# Patient Record
Sex: MALE | Race: WHITE | NOT HISPANIC OR LATINO | Employment: FULL TIME | URBAN - METROPOLITAN AREA
[De-identification: names, ages, dates, MRNs, and addresses within clinical notes are randomized per-mention and may not be internally consistent; named-entity substitution may affect disease eponyms.]

---

## 2023-10-22 PROBLEM — F41.9 ANXIETY: Status: RESOLVED | Noted: 2023-10-22 | Resolved: 2023-10-22

## 2023-10-23 ENCOUNTER — APPOINTMENT (OUTPATIENT)
Dept: PRIMARY CARE | Facility: CLINIC | Age: 43
End: 2023-10-23
Payer: COMMERCIAL

## 2023-10-23 NOTE — PROGRESS NOTES
Subjective   Patient ID: Huber Owens is a 43 y.o. male who presents for No chief complaint on file..  HPI    Review of Systems    Objective   Physical Exam    Assessment/Plan   Problem List Items Addressed This Visit    None

## 2023-10-31 ENCOUNTER — OFFICE VISIT (OUTPATIENT)
Dept: PRIMARY CARE | Facility: CLINIC | Age: 43
End: 2023-10-31
Payer: COMMERCIAL

## 2023-10-31 VITALS
SYSTOLIC BLOOD PRESSURE: 135 MMHG | OXYGEN SATURATION: 97 % | TEMPERATURE: 98.4 F | HEIGHT: 67 IN | BODY MASS INDEX: 31.96 KG/M2 | HEART RATE: 79 BPM | DIASTOLIC BLOOD PRESSURE: 95 MMHG | WEIGHT: 203.6 LBS

## 2023-10-31 DIAGNOSIS — R73.9 ELEVATED BLOOD SUGAR: ICD-10-CM

## 2023-10-31 DIAGNOSIS — R09.81 SINUS CONGESTION: ICD-10-CM

## 2023-10-31 DIAGNOSIS — L29.9 ITCHING: Primary | ICD-10-CM

## 2023-10-31 DIAGNOSIS — F17.200 SMOKER: ICD-10-CM

## 2023-10-31 PROCEDURE — 80053 COMPREHEN METABOLIC PANEL: CPT

## 2023-10-31 PROCEDURE — 84443 ASSAY THYROID STIM HORMONE: CPT

## 2023-10-31 PROCEDURE — 83036 HEMOGLOBIN GLYCOSYLATED A1C: CPT

## 2023-10-31 PROCEDURE — 36415 COLL VENOUS BLD VENIPUNCTURE: CPT

## 2023-10-31 PROCEDURE — 99213 OFFICE O/P EST LOW 20 MIN: CPT | Performed by: NURSE PRACTITIONER

## 2023-10-31 PROCEDURE — 85025 COMPLETE CBC W/AUTO DIFF WBC: CPT

## 2023-10-31 RX ORDER — PREDNISONE 10 MG/1
TABLET ORAL
Qty: 21 TABLET | Refills: 0 | Status: SHIPPED | OUTPATIENT
Start: 2023-10-31 | End: 2023-11-01 | Stop reason: ALTCHOICE

## 2023-10-31 ASSESSMENT — ENCOUNTER SYMPTOMS
DIARRHEA: 0
VOMITING: 0
POLYDIPSIA: 0
NUMBNESS: 0
EYE PAIN: 0
EYE REDNESS: 0
COLOR CHANGE: 0
TROUBLE SWALLOWING: 0
VOICE CHANGE: 0
POLYPHAGIA: 0
FEVER: 0
SINUS PRESSURE: 0
ACTIVITY CHANGE: 0
UNEXPECTED WEIGHT CHANGE: 0
EYE DISCHARGE: 0
CHEST TIGHTNESS: 0
SHORTNESS OF BREATH: 0
CHOKING: 0
WOUND: 0
BRUISES/BLEEDS EASILY: 0
EYE ITCHING: 0
WEAKNESS: 0
FATIGUE: 0
PHOTOPHOBIA: 0
FACIAL SWELLING: 0
RHINORRHEA: 0
STRIDOR: 0
PALPITATIONS: 0
DIAPHORESIS: 0
HEADACHES: 0
WHEEZING: 0
ADENOPATHY: 0
ABDOMINAL PAIN: 0
SINUS PAIN: 0
NAUSEA: 0
ROS SKIN COMMENTS: ITCHING
APPETITE CHANGE: 0
APNEA: 0
DIZZINESS: 0
LIGHT-HEADEDNESS: 0
CHILLS: 0
COUGH: 0
SORE THROAT: 0

## 2023-10-31 NOTE — PROGRESS NOTES
"Subjective   Patient ID: Huber Owens is a 43 y.o. male who presents for itching.    HPI   Patient in office with complaint of itching to upper back (no rash) and nasal congestion x 2 weeks. Smoker. No other symptoms or concerns.     Review of Systems   Constitutional:  Negative for activity change, appetite change, chills, diaphoresis, fatigue, fever and unexpected weight change.   HENT:  Positive for congestion. Negative for ear discharge, ear pain, facial swelling, mouth sores, nosebleeds, postnasal drip, rhinorrhea, sinus pressure, sinus pain, sneezing, sore throat, tinnitus, trouble swallowing and voice change.    Eyes:  Negative for photophobia, pain, discharge, redness, itching and visual disturbance.   Respiratory:  Negative for apnea, cough, choking, chest tightness, shortness of breath, wheezing and stridor.    Cardiovascular:  Negative for chest pain, palpitations and leg swelling.   Gastrointestinal:  Negative for abdominal pain, diarrhea, nausea and vomiting.   Endocrine: Negative for cold intolerance, heat intolerance, polydipsia, polyphagia and polyuria.   Musculoskeletal:  Negative for gait problem.   Skin:  Negative for color change, pallor, rash and wound.        itching   Neurological:  Negative for dizziness, syncope, weakness, light-headedness, numbness and headaches.   Hematological:  Negative for adenopathy. Does not bruise/bleed easily.       Objective   BP (!) 135/95   Pulse 79   Temp 36.9 °C (98.4 °F) (Temporal)   Ht 1.702 m (5' 7\")   Wt 92.4 kg (203 lb 9.6 oz)   SpO2 97%   BMI 31.89 kg/m²     Physical Exam  Constitutional:       Appearance: Normal appearance.   HENT:      Head: Normocephalic.      Right Ear: Tympanic membrane, ear canal and external ear normal.      Left Ear: Tympanic membrane, ear canal and external ear normal.      Nose: Congestion present.      Mouth/Throat:      Mouth: Mucous membranes are moist.      Pharynx: Oropharynx is clear. No oropharyngeal exudate or " posterior oropharyngeal erythema.   Eyes:      General: No scleral icterus.        Right eye: No discharge.         Left eye: No discharge.      Conjunctiva/sclera: Conjunctivae normal.      Pupils: Pupils are equal, round, and reactive to light.   Cardiovascular:      Rate and Rhythm: Normal rate and regular rhythm.      Pulses: Normal pulses.      Heart sounds: Normal heart sounds.   Pulmonary:      Effort: Pulmonary effort is normal.      Breath sounds: Normal breath sounds.   Musculoskeletal:      Cervical back: Normal range of motion and neck supple. No rigidity or tenderness.   Lymphadenopathy:      Cervical: No cervical adenopathy.   Skin:     Coloration: Skin is not jaundiced or pale.      Findings: No bruising, erythema, lesion or rash.      Comments: No rash or skin abnormality observed associated with lower thoracic area of itching    Neurological:      Mental Status: He is alert.       Assessment/Plan     Exam finding reviewed with patient. Medications discussed. May use OTC Zyrtec for itching, PRN. Advocated smoking cessation. We will call with lab results when received and update the plan of care. Follow up in 1 week or earlier if no improvement.

## 2023-11-01 DIAGNOSIS — E11.9 TYPE 2 DIABETES MELLITUS WITHOUT COMPLICATION, WITHOUT LONG-TERM CURRENT USE OF INSULIN (MULTI): Primary | ICD-10-CM

## 2023-11-01 DIAGNOSIS — R73.9 ELEVATED BLOOD SUGAR: Primary | ICD-10-CM

## 2023-11-01 LAB
ALBUMIN SERPL BCP-MCNC: 4.7 G/DL (ref 3.4–5)
ALP SERPL-CCNC: 63 U/L (ref 33–120)
ALT SERPL W P-5'-P-CCNC: 45 U/L (ref 10–52)
ANION GAP SERPL CALC-SCNC: 15 MMOL/L (ref 10–20)
AST SERPL W P-5'-P-CCNC: 25 U/L (ref 9–39)
BASOPHILS # BLD AUTO: 0.04 X10*3/UL (ref 0–0.1)
BASOPHILS NFR BLD AUTO: 0.5 %
BILIRUB SERPL-MCNC: 1.2 MG/DL (ref 0–1.2)
BUN SERPL-MCNC: 11 MG/DL (ref 6–23)
CALCIUM SERPL-MCNC: 9.8 MG/DL (ref 8.6–10.6)
CHLORIDE SERPL-SCNC: 98 MMOL/L (ref 98–107)
CO2 SERPL-SCNC: 24 MMOL/L (ref 21–32)
CREAT SERPL-MCNC: 0.93 MG/DL (ref 0.5–1.3)
EOSINOPHIL # BLD AUTO: 0.09 X10*3/UL (ref 0–0.7)
EOSINOPHIL NFR BLD AUTO: 1.1 %
ERYTHROCYTE [DISTWIDTH] IN BLOOD BY AUTOMATED COUNT: 12.4 % (ref 11.5–14.5)
EST. AVERAGE GLUCOSE BLD GHB EST-MCNC: 217 MG/DL
GFR SERPL CREATININE-BSD FRML MDRD: >90 ML/MIN/1.73M*2
GLUCOSE SERPL-MCNC: 195 MG/DL (ref 74–99)
HBA1C MFR BLD: 9.2 %
HCT VFR BLD AUTO: 51.3 % (ref 41–52)
HGB BLD-MCNC: 17.2 G/DL (ref 13.5–17.5)
IMM GRANULOCYTES # BLD AUTO: 0.03 X10*3/UL (ref 0–0.7)
IMM GRANULOCYTES NFR BLD AUTO: 0.4 % (ref 0–0.9)
LYMPHOCYTES # BLD AUTO: 2.36 X10*3/UL (ref 1.2–4.8)
LYMPHOCYTES NFR BLD AUTO: 27.9 %
MCH RBC QN AUTO: 28.2 PG (ref 26–34)
MCHC RBC AUTO-ENTMCNC: 33.5 G/DL (ref 32–36)
MCV RBC AUTO: 84 FL (ref 80–100)
MONOCYTES # BLD AUTO: 0.62 X10*3/UL (ref 0.1–1)
MONOCYTES NFR BLD AUTO: 7.3 %
NEUTROPHILS # BLD AUTO: 5.32 X10*3/UL (ref 1.2–7.7)
NEUTROPHILS NFR BLD AUTO: 62.8 %
NRBC BLD-RTO: 0 /100 WBCS (ref 0–0)
PLATELET # BLD AUTO: 240 X10*3/UL (ref 150–450)
PMV BLD AUTO: 10.9 FL (ref 7.5–11.5)
POTASSIUM SERPL-SCNC: 4.3 MMOL/L (ref 3.5–5.3)
PROT SERPL-MCNC: 7.6 G/DL (ref 6.4–8.2)
RBC # BLD AUTO: 6.1 X10*6/UL (ref 4.5–5.9)
SODIUM SERPL-SCNC: 133 MMOL/L (ref 136–145)
TSH SERPL-ACNC: 1.1 MIU/L (ref 0.44–3.98)
WBC # BLD AUTO: 8.5 X10*3/UL (ref 4.4–11.3)

## 2023-11-01 RX ORDER — INSULIN PUMP SYRINGE, 3 ML
EACH MISCELLANEOUS
Qty: 1 EACH | Refills: 0 | Status: SHIPPED | OUTPATIENT
Start: 2023-11-01 | End: 2023-11-01

## 2023-11-01 RX ORDER — METFORMIN HYDROCHLORIDE 500 MG/1
500 TABLET ORAL
Qty: 60 TABLET | Refills: 3 | Status: SHIPPED | OUTPATIENT
Start: 2023-11-01 | End: 2023-12-07 | Stop reason: DRUGHIGH

## 2023-11-01 RX ORDER — BLOOD SUGAR DIAGNOSTIC
STRIP MISCELLANEOUS
Qty: 100 STRIP | Refills: 1 | Status: SHIPPED | OUTPATIENT
Start: 2023-11-01 | End: 2023-12-07 | Stop reason: SDUPTHER

## 2023-11-01 RX ORDER — LANCETS
EACH MISCELLANEOUS
Qty: 100 EACH | Refills: 1 | Status: SHIPPED | OUTPATIENT
Start: 2023-11-01

## 2023-11-01 NOTE — PROGRESS NOTES
Subjective   Patient ID: Huber Owens is a 43 y.o. male who presents for No chief complaint on file..    HPI     Review of Systems    Objective   There were no vitals taken for this visit.    Physical Exam    Assessment/Plan

## 2023-11-02 NOTE — RESULT ENCOUNTER NOTE
Lab results reviewed with patient. Patient educated on diabetes diagnosis and the importance of continuous close blood glucose control. Advised the patient to stop oral Prednisone treatment. Explained to the patient that his itching (paraesthesia) is likely related to his diabetes. He will start checking and recording his AM fasting glucose levels daily and as needed. We will review hsi recorded results at the next office visit. The patient will call the office with levels <70 and >300. Emergent signs of hypo- and hyperglycemia discussed; patient verbalized understanding. The patient understands how to address low blood sugar by eating a sweet snack or drinking orange juice. Medications discussed. Discussed needs for statin, anti-hyperglycemic, Lisinopril, and Aspirin treatment. Risks and benefits explained in detail; the patient verbalized understanding. He agrees to starting on Metformin. Patient declines any additional medication at this time. Follow up in 4 weeks for reevaluation and blood sugar review or earlier as needed.     Benefits of healthy lifestyle discussed: eat smaller, regular meals, low carbohydrates/fat/sugar diet; use lean white instead of red meet; use several servings of low-sugar fruit and vegetables daily; use whole grain flour instead of white flour products; cook at home frequently instead of eating out; exercise 30-90 minutes 5 x/week.

## 2023-11-07 ENCOUNTER — TELEPHONE (OUTPATIENT)
Dept: PRIMARY CARE | Facility: CLINIC | Age: 43
End: 2023-11-07
Payer: COMMERCIAL

## 2023-11-07 NOTE — TELEPHONE ENCOUNTER
Huber Owens phoned, [phone].  He made an appointment for one month out.  I was not sure if this was one month or a physical. So, I scheduled him for a physical on 12/7 at 2:00 p.m. If this is not a physical - let me know and I will change this. He also made an appointment for 3 months out for his A1C and cholesterol on 2/8 at 2:00 p.m.  Does he need an appointment a week prior for blood work for this.  If so, let me know and I can call him back to make this appointment too.

## 2023-12-07 ENCOUNTER — OFFICE VISIT (OUTPATIENT)
Dept: PRIMARY CARE | Facility: CLINIC | Age: 43
End: 2023-12-07
Payer: COMMERCIAL

## 2023-12-07 VITALS
HEART RATE: 76 BPM | BODY MASS INDEX: 31.58 KG/M2 | OXYGEN SATURATION: 97 % | WEIGHT: 201.6 LBS | SYSTOLIC BLOOD PRESSURE: 119 MMHG | DIASTOLIC BLOOD PRESSURE: 80 MMHG | TEMPERATURE: 97.2 F

## 2023-12-07 DIAGNOSIS — E11.9 TYPE 2 DIABETES MELLITUS WITHOUT COMPLICATION, WITHOUT LONG-TERM CURRENT USE OF INSULIN (MULTI): ICD-10-CM

## 2023-12-07 PROCEDURE — 99213 OFFICE O/P EST LOW 20 MIN: CPT | Performed by: NURSE PRACTITIONER

## 2023-12-07 PROCEDURE — 3046F HEMOGLOBIN A1C LEVEL >9.0%: CPT | Performed by: NURSE PRACTITIONER

## 2023-12-07 PROCEDURE — 3074F SYST BP LT 130 MM HG: CPT | Performed by: NURSE PRACTITIONER

## 2023-12-07 PROCEDURE — 3079F DIAST BP 80-89 MM HG: CPT | Performed by: NURSE PRACTITIONER

## 2023-12-07 RX ORDER — BLOOD SUGAR DIAGNOSTIC
STRIP MISCELLANEOUS
Qty: 100 STRIP | Refills: 2 | Status: SHIPPED | OUTPATIENT
Start: 2023-12-07

## 2023-12-07 RX ORDER — BLOOD-GLUCOSE METER
EACH MISCELLANEOUS
COMMUNITY
Start: 2023-11-01

## 2023-12-07 RX ORDER — METFORMIN HYDROCHLORIDE 500 MG/1
TABLET ORAL
Qty: 30 TABLET | Refills: 1 | Status: SHIPPED | OUTPATIENT
Start: 2023-12-07 | End: 2024-01-03 | Stop reason: DRUGHIGH

## 2023-12-07 RX ORDER — METFORMIN HYDROCHLORIDE 850 MG/1
TABLET ORAL
Qty: 30 TABLET | Refills: 3 | Status: SHIPPED | OUTPATIENT
Start: 2023-12-07 | End: 2024-02-06 | Stop reason: WASHOUT

## 2023-12-07 ASSESSMENT — ENCOUNTER SYMPTOMS
APNEA: 0
EYE DISCHARGE: 0
STRIDOR: 0
DIAPHORESIS: 0
TROUBLE SWALLOWING: 0
PHOTOPHOBIA: 0
NAUSEA: 0
POLYPHAGIA: 0
UNEXPECTED WEIGHT CHANGE: 0
CHOKING: 0
ACTIVITY CHANGE: 0
HEADACHES: 0
ADENOPATHY: 0
BRUISES/BLEEDS EASILY: 0
VOICE CHANGE: 0
FATIGUE: 0
WOUND: 0
CHILLS: 0
DIARRHEA: 0
SINUS PRESSURE: 0
EYE ITCHING: 0
PALPITATIONS: 0
EYE REDNESS: 0
SHORTNESS OF BREATH: 0
APPETITE CHANGE: 0
FEVER: 0
LIGHT-HEADEDNESS: 0
FACIAL SWELLING: 0
CHEST TIGHTNESS: 0
SINUS PAIN: 0
DIZZINESS: 0
COUGH: 0
NUMBNESS: 0
COLOR CHANGE: 0
WEAKNESS: 0
SORE THROAT: 0
EYE PAIN: 0
POLYDIPSIA: 0
ABDOMINAL PAIN: 0
VOMITING: 0
RHINORRHEA: 0
WHEEZING: 0

## 2023-12-07 NOTE — PROGRESS NOTES
Subjective   Patient ID: Huber Owens is a 43 y.o. male who presents for itching.    HPI   Patient in office for a follow-up on DM 2. He has made dietary adjustments and has been checking his blood sugars levels regularly after getting up at 4:30 AM (he works early shift) and in the afternoon (around 4 PM, after work). His morning levels have been in the 160s-170s on average. His afternoon levels have been lower: 110s-130s.  His itching has subsided, and he is feeling much better overall.     The patient monitors his feet for lesions. He requests a referral to ophthalmology for a diabetic eye exam (hand-written prescription provided). The patient declines repeat lab work today. He agrees to a medication dose adjustment.     Review of Systems   Constitutional:  Negative for activity change, appetite change, chills, diaphoresis, fatigue, fever and unexpected weight change.   HENT:  Negative for congestion, ear discharge, ear pain, facial swelling, mouth sores, nosebleeds, postnasal drip, rhinorrhea, sinus pressure, sinus pain, sneezing, sore throat, tinnitus, trouble swallowing and voice change.    Eyes:  Negative for photophobia, pain, discharge, redness, itching and visual disturbance.   Respiratory:  Negative for apnea, cough, choking, chest tightness, shortness of breath, wheezing and stridor.    Cardiovascular:  Negative for chest pain, palpitations and leg swelling.   Gastrointestinal:  Negative for abdominal pain, diarrhea, nausea and vomiting.   Endocrine: Negative for cold intolerance, heat intolerance, polydipsia, polyphagia and polyuria.   Musculoskeletal:  Negative for gait problem.   Skin:  Negative for color change, pallor, rash and wound.   Neurological:  Negative for dizziness, syncope, weakness, light-headedness, numbness and headaches.   Hematological:  Negative for adenopathy. Does not bruise/bleed easily.     Objective   /80   Pulse 76   Temp 36.2 °C (97.2 °F) (Temporal)   Wt 91.4 kg  (201 lb 9.6 oz)   SpO2 97%   BMI 31.58 kg/m²       Assessment/Plan     Exam findings reviewed with patient. Importance of continuous close blood glucose control reviewed. Continue checking and recording AM fasting glucose levels daily and as needed. We will review his recorded results at the next office visit. The patient will call the office with levels <70 and >300. Emergent signs of hypo- and hyperglycemia discussed; patient verbalized understanding. The patient understands how to address low blood sugar by eating a sweet snack or drinking orange juice. Medications reviewed. The patient would like to hold off on statin, Lisinopril, and Aspirin treatment. Risks and benefits explained in detail; the patient verbalized understanding. Follow up in 8 weeks for reevaluation, physical and A1C recheck or earlier as needed.      Benefits of healthy lifestyle discussed: eat smaller, regular meals, low carbohydrates/fat/sugar diet; use lean white instead of red meet; use several servings of low-sugar fruit and vegetables daily; use whole grain flour instead of white flour products; cook at home frequently instead of eating out; exercise 30-90 minutes 5 x/week.

## 2023-12-13 DIAGNOSIS — E11.9 TYPE 2 DIABETES MELLITUS WITHOUT COMPLICATION, WITHOUT LONG-TERM CURRENT USE OF INSULIN (MULTI): Primary | ICD-10-CM

## 2023-12-18 ENCOUNTER — APPOINTMENT (OUTPATIENT)
Dept: PHARMACY | Facility: HOSPITAL | Age: 43
End: 2023-12-18
Payer: COMMERCIAL

## 2023-12-19 ENCOUNTER — TELEMEDICINE (OUTPATIENT)
Dept: PHARMACY | Facility: HOSPITAL | Age: 43
End: 2023-12-19
Payer: COMMERCIAL

## 2023-12-19 DIAGNOSIS — E11.9 TYPE 2 DIABETES MELLITUS WITHOUT COMPLICATION, WITHOUT LONG-TERM CURRENT USE OF INSULIN (MULTI): Primary | ICD-10-CM

## 2023-12-19 ASSESSMENT — ENCOUNTER SYMPTOMS
BLURRED VISION: 1
VISUAL CHANGE: 1

## 2023-12-19 NOTE — PROGRESS NOTES
"Subjective     Patient ID: Huber Owens is a 43 y.o. male who presents for diabetes management.    Diabetes  He presents for his initial diabetic visit. He has type 2 diabetes mellitus. There are no hypoglycemic associated symptoms. Associated symptoms include blurred vision and visual change. There are no hypoglycemic complications. Diabetic complications include autonomic neuropathy and retinopathy. Risk factors for coronary artery disease include diabetes mellitus, obesity and male sex. Current diabetic treatment includes oral agent (monotherapy). His home blood glucose trend is decreasing steadily. His overall blood glucose range is 140-180 mg/dl. An ACE inhibitor/angiotensin II receptor blocker is not being taken. He does not see a podiatrist.Eye exam is current.     No Known Allergies    Objective     Current DM Pharmacotherapy:   -metformin 500mg 1 po every day in the morning  -metformin 850mg 1 po hs    SECONDARY PREVENTION  - Statin? No  - ACE-I/ARB? No  - Aspirin? No    Current monitoring regimen:   Patient is using: glucometer    SMBG Readings Random:   172, 168, 116, 141, 234, 100, 151, 155, 121, 173, 166, 184, 221, 171, 175  Average 163     Any episodes of hypoglycemia? No  Hypoglycemia awareness? No    Pertinent PMH Review:  - PMH of Pancreatitis: No  - PMH/FH of Medullary Thyroid Cancer: Yes  - PMH of Retinopathy: Yes (following with opthalmology)  - PMH of Urinary Tract Infections: No    Lab Review  Lab Results   Component Value Date    BILITOT 1.2 10/31/2023    CALCIUM 9.8 10/31/2023    CO2 24 10/31/2023    CL 98 10/31/2023    CREATININE 0.93 10/31/2023    GLUCOSE 195 (H) 10/31/2023    ALKPHOS 63 10/31/2023    K 4.3 10/31/2023    PROT 7.6 10/31/2023     (L) 10/31/2023    AST 25 10/31/2023    ALT 45 10/31/2023    BUN 11 10/31/2023    ANIONGAP 15 10/31/2023    ALBUMIN 4.7 10/31/2023     No results found for: \"TRIG\", \"CHOL\", \"LDL\", \"LDLCALC\", \"HDL\"  Lab Results   Component Value Date    HGBA1C " 9.2 (H) 10/31/2023     The ASCVD Risk score (Taras ZUNIGA, et al., 2019) failed to calculate for the following reasons:    Cannot find a previous HDL lab    Cannot find a previous total cholesterol lab    Assessment/Plan     Problem List Items Addressed This Visit       Type 2 diabetes mellitus without complication, without long-term current use of insulin (CMS/[facility]) - Primary     Patient recently diagnosed with diabetes. He was increased from metformin 500mg qam to metformin 500mg qam and 850mg hs. Fasting readings are still slightly elevated, but all postprandial readings are in goal. Patient has only be using increased metformin dosing for 2 weeks. Will meet with the patient in 2 weeks to review BG readings and increase metformin.     Future Considerations:  Patient has a high deductible plan which would make SGLT-2 or GLP-1 therapy an expensive option for this patient. Patient also stated his father had a thyroid and pituitary tumor. He is unsure if the tumor was papillary or medullary. GLP-1 therapy should be considered in caution. Will increase to max dose of metformin and get updated A1C before consider adding on additional therapy.          Type 2 diabetes mellitus, is not at goal. <7%    Follow up: I recommend diabetes care be 2 weeks.    Mela Barahona, PharmD    Continue all meds under the continuation of care with the referring provider and clinical pharmacy team

## 2023-12-19 NOTE — ASSESSMENT & PLAN NOTE
Patient recently diagnosed with diabetes. He was increased from metformin 500mg qam to metformin 500mg qam and 850mg hs. Fasting readings are still slightly elevated, but all postprandial readings are in goal. Patient has only be using increased metformin dosing for 2 weeks. Will meet with the patient in 2 weeks to review BG readings and increase metformin.     Future Considerations:  Patient has a high deductible plan which would make SGLT-2 or GLP-1 therapy an expensive option for this patient. Patient also stated his father had a thyroid and pituitary tumor. He is unsure if the tumor was papillary or medullary. GLP-1 therapy should be considered in caution. Will increase to max dose of metformin and get updated A1C before consider adding on additional therapy.

## 2024-01-03 ENCOUNTER — TELEMEDICINE (OUTPATIENT)
Dept: PHARMACY | Facility: HOSPITAL | Age: 44
End: 2024-01-03
Payer: COMMERCIAL

## 2024-01-03 DIAGNOSIS — E11.9 TYPE 2 DIABETES MELLITUS WITHOUT COMPLICATION, WITHOUT LONG-TERM CURRENT USE OF INSULIN (MULTI): ICD-10-CM

## 2024-01-03 RX ORDER — METFORMIN HYDROCHLORIDE 1000 MG/1
1000 TABLET ORAL
Qty: 30 TABLET | Refills: 11 | Status: SHIPPED | OUTPATIENT
Start: 2024-01-03 | End: 2024-02-06 | Stop reason: SDUPTHER

## 2024-01-03 ASSESSMENT — ENCOUNTER SYMPTOMS
BLURRED VISION: 1
VISUAL CHANGE: 1

## 2024-01-03 NOTE — PROGRESS NOTES
"Subjective     Patient ID: Huber Owens is a 43 y.o. male who presents for diabetes management.    Diabetes  He presents for his follow-up diabetic visit. He has type 2 diabetes mellitus. There are no hypoglycemic associated symptoms. Associated symptoms include blurred vision and visual change. There are no hypoglycemic complications. Diabetic complications include autonomic neuropathy and retinopathy. Risk factors for coronary artery disease include diabetes mellitus, obesity and male sex. Current diabetic treatment includes oral agent (monotherapy). His home blood glucose trend is decreasing steadily. His overall blood glucose range is 140-180 mg/dl. An ACE inhibitor/angiotensin II receptor blocker is not being taken. He does not see a podiatrist.Eye exam is current.     No Known Allergies    Objective     Current DM Pharmacotherapy:   -metformin 500mg 1 po every day in the morning  -metformin 850mg 1 po hs    SECONDARY PREVENTION  - Statin? No  - ACE-I/ARB? No  - Aspirin? No    Current monitoring regimen:   Patient is using: glucometer    SMBG Readings Fastin's to 150's  133 yesterday morning    Any episodes of hypoglycemia? No  Hypoglycemia awareness? No    Pertinent PMH Review:  - PMH of Pancreatitis: No  - PMH/FH of Medullary Thyroid Cancer: Yes  - PMH of Retinopathy: Yes (following with opthalmology)  - PMH of Urinary Tract Infections: No    Lab Review  Lab Results   Component Value Date    BILITOT 1.2 10/31/2023    CALCIUM 9.8 10/31/2023    CO2 24 10/31/2023    CL 98 10/31/2023    CREATININE 0.93 10/31/2023    GLUCOSE 195 (H) 10/31/2023    ALKPHOS 63 10/31/2023    K 4.3 10/31/2023    PROT 7.6 10/31/2023     (L) 10/31/2023    AST 25 10/31/2023    ALT 45 10/31/2023    BUN 11 10/31/2023    ANIONGAP 15 10/31/2023    ALBUMIN 4.7 10/31/2023     No results found for: \"TRIG\", \"CHOL\", \"LDL\", \"LDLCALC\", \"HDL\"  Lab Results   Component Value Date    HGBA1C 9.2 (H) 10/31/2023     The ASCVD Risk score " (Taras ZUNIGA, et al., 2019) failed to calculate for the following reasons:    Cannot find a previous HDL lab    Cannot find a previous total cholesterol lab    Assessment/Plan     Problem List Items Addressed This Visit       Type 2 diabetes mellitus without complication, without long-term current use of insulin (CMS/[facility])   Fasting BG has been high this past month, which he attributes due to holiday eating. Will increase metformin to help bring fasting readings into goal and will follow up in 4 weeks.    Type 2 diabetes mellitus, is not at goal. <7%    INCREASE metformin to 850mg in the morning and 1,000mg at night  Follow up: I recommend diabetes care be 4 weeks.    Mela Barahona, PharmD    Continue all meds under the continuation of care with the referring provider and clinical pharmacy team

## 2024-01-31 ENCOUNTER — TELEMEDICINE (OUTPATIENT)
Dept: PHARMACY | Facility: HOSPITAL | Age: 44
End: 2024-01-31
Payer: COMMERCIAL

## 2024-01-31 DIAGNOSIS — E11.9 TYPE 2 DIABETES MELLITUS WITHOUT COMPLICATION, WITHOUT LONG-TERM CURRENT USE OF INSULIN (MULTI): ICD-10-CM

## 2024-01-31 ASSESSMENT — ENCOUNTER SYMPTOMS
BLURRED VISION: 1
VISUAL CHANGE: 1

## 2024-01-31 NOTE — PROGRESS NOTES
"Subjective     Patient ID: Huber Owens is a 43 y.o. male who presents for diabetes management.    Diabetes  He presents for his follow-up diabetic visit. He has type 2 diabetes mellitus. There are no hypoglycemic associated symptoms. Associated symptoms include blurred vision and visual change. There are no hypoglycemic complications. Diabetic complications include autonomic neuropathy and retinopathy. Risk factors for coronary artery disease include diabetes mellitus, obesity and male sex. Current diabetic treatment includes oral agent (monotherapy). His home blood glucose trend is decreasing steadily. His overall blood glucose range is 140-180 mg/dl. An ACE inhibitor/angiotensin II receptor blocker is not being taken. He does not see a podiatrist.Eye exam is current.     No Known Allergies    Objective     Current DM Pharmacotherapy:   -metformin 1000mg 1 po every day in the morning  -metformin 850mg 1 po hs    SECONDARY PREVENTION  - Statin? No  - ACE-I/ARB? No  - Aspirin? No    Current monitoring regimen:   Patient is using: glucometer    SMBG Readings Fastin to 150 patient did not have log with him    Any episodes of hypoglycemia? No  Hypoglycemia awareness? No    Pertinent PMH Review:  - PMH of Pancreatitis: No  - PMH/FH of Medullary Thyroid Cancer: Yes  - PMH of Retinopathy: Yes (following with opthalmology)  - PMH of Urinary Tract Infections: No    Lab Review  Lab Results   Component Value Date    BILITOT 1.2 10/31/2023    CALCIUM 9.8 10/31/2023    CO2 24 10/31/2023    CL 98 10/31/2023    CREATININE 0.93 10/31/2023    GLUCOSE 195 (H) 10/31/2023    ALKPHOS 63 10/31/2023    K 4.3 10/31/2023    PROT 7.6 10/31/2023     (L) 10/31/2023    AST 25 10/31/2023    ALT 45 10/31/2023    BUN 11 10/31/2023    ANIONGAP 15 10/31/2023    ALBUMIN 4.7 10/31/2023     No results found for: \"TRIG\", \"CHOL\", \"LDL\", \"LDLCALC\", \"HDL\"  Lab Results   Component Value Date    HGBA1C 9.2 (H) 10/31/2023     The ASCVD Risk " score (Taras ZUNIGA, et al., 2019) failed to calculate for the following reasons:    Cannot find a previous HDL lab    Cannot find a previous total cholesterol lab    Assessment/Plan     Problem List Items Addressed This Visit       Type 2 diabetes mellitus without complication, without long-term current use of insulin (CMS/[facility])   Fasting BG has come down since first visit. No issues with increase dose of metformin. Patient is due to have repeat A1C lab work next week. Will follow up in 1 week to review lab work.    Type 2 diabetes mellitus, is not at goal. <7%    Follow up: I recommend diabetes care be 1 week.    Mela Barahona, PharmD    Continue all meds under the continuation of care with the referring provider and clinical pharmacy team

## 2024-02-06 ENCOUNTER — OFFICE VISIT (OUTPATIENT)
Dept: PRIMARY CARE | Facility: CLINIC | Age: 44
End: 2024-02-06
Payer: COMMERCIAL

## 2024-02-06 VITALS
BODY MASS INDEX: 31.86 KG/M2 | WEIGHT: 203 LBS | OXYGEN SATURATION: 98 % | HEIGHT: 67 IN | DIASTOLIC BLOOD PRESSURE: 81 MMHG | TEMPERATURE: 98.5 F | SYSTOLIC BLOOD PRESSURE: 124 MMHG | RESPIRATION RATE: 18 BRPM | HEART RATE: 78 BPM

## 2024-02-06 DIAGNOSIS — Z00.00 HEALTHCARE MAINTENANCE: Primary | ICD-10-CM

## 2024-02-06 DIAGNOSIS — E11.9 TYPE 2 DIABETES MELLITUS WITHOUT COMPLICATION, WITHOUT LONG-TERM CURRENT USE OF INSULIN (MULTI): ICD-10-CM

## 2024-02-06 LAB — POC HEMOGLOBIN A1C: 7.1 % (ref 4.2–6.5)

## 2024-02-06 PROCEDURE — 80061 LIPID PANEL: CPT

## 2024-02-06 PROCEDURE — 4004F PT TOBACCO SCREEN RCVD TLK: CPT | Performed by: FAMILY MEDICINE

## 2024-02-06 PROCEDURE — 82043 UR ALBUMIN QUANTITATIVE: CPT

## 2024-02-06 PROCEDURE — 80053 COMPREHEN METABOLIC PANEL: CPT

## 2024-02-06 PROCEDURE — 85027 COMPLETE CBC AUTOMATED: CPT

## 2024-02-06 PROCEDURE — 99396 PREV VISIT EST AGE 40-64: CPT | Performed by: FAMILY MEDICINE

## 2024-02-06 PROCEDURE — 83036 HEMOGLOBIN GLYCOSYLATED A1C: CPT | Performed by: FAMILY MEDICINE

## 2024-02-06 PROCEDURE — 36415 COLL VENOUS BLD VENIPUNCTURE: CPT

## 2024-02-06 PROCEDURE — 90471 IMMUNIZATION ADMIN: CPT | Performed by: FAMILY MEDICINE

## 2024-02-06 PROCEDURE — 3079F DIAST BP 80-89 MM HG: CPT | Performed by: FAMILY MEDICINE

## 2024-02-06 PROCEDURE — 3074F SYST BP LT 130 MM HG: CPT | Performed by: FAMILY MEDICINE

## 2024-02-06 PROCEDURE — 86803 HEPATITIS C AB TEST: CPT

## 2024-02-06 PROCEDURE — 90715 TDAP VACCINE 7 YRS/> IM: CPT | Performed by: FAMILY MEDICINE

## 2024-02-06 PROCEDURE — 87389 HIV-1 AG W/HIV-1&-2 AB AG IA: CPT

## 2024-02-06 PROCEDURE — 81003 URINALYSIS AUTO W/O SCOPE: CPT

## 2024-02-06 PROCEDURE — 82570 ASSAY OF URINE CREATININE: CPT

## 2024-02-06 RX ORDER — METFORMIN HYDROCHLORIDE 1000 MG/1
1000 TABLET ORAL
Qty: 60 TABLET | Refills: 5 | Status: SHIPPED | OUTPATIENT
Start: 2024-02-06 | End: 2024-08-04

## 2024-02-06 ASSESSMENT — ENCOUNTER SYMPTOMS
VOMITING: 0
DIZZINESS: 0
AGITATION: 0
FATIGUE: 1
CONSTIPATION: 0
FEVER: 0
ABDOMINAL PAIN: 0
MYALGIAS: 0
CHILLS: 0
COUGH: 0
ADENOPATHY: 0
WEAKNESS: 0
EYE DISCHARGE: 0
PALPITATIONS: 0
POLYDIPSIA: 0
HALLUCINATIONS: 0
ABDOMINAL DISTENTION: 0
DIARRHEA: 0
HEMATURIA: 0
SHORTNESS OF BREATH: 0
SINUS PRESSURE: 0
NAUSEA: 0
JOINT SWELLING: 0
DIFFICULTY URINATING: 0
BRUISES/BLEEDS EASILY: 0
SORE THROAT: 0
CONFUSION: 0
SEIZURES: 0

## 2024-02-06 NOTE — PROGRESS NOTES
Subjective   Patient ID: Huber Owens is a 43 y.o. male who presents for Annual Exam.  Well Adult Physical   Patient here for a comprehensive physical exam.The patient reports problems -patient was diagnosed in this office a few months ago with diabetes and has been tolerating his medications well with good adherence.  He also has been working on his diet.    Diet: Working in [employer] diet, limiting pop    Exercise: Very active at work     Social History    Tobacco Use      Smoking status: Every Day        Types: Cigarettes      Smokeless tobacco: Never    Alcohol use: Yes    Drug use: Never        There is no immunization history on file for this patient.             Review of Systems   Constitutional:  Positive for fatigue. Negative for chills and fever.   HENT:  Negative for ear pain, sinus pressure and sore throat.    Eyes:  Negative for discharge and visual disturbance.   Respiratory:  Negative for cough and shortness of breath.    Cardiovascular:  Negative for chest pain and palpitations.   Gastrointestinal:  Negative for abdominal distention, abdominal pain, constipation, diarrhea, nausea and vomiting.   Endocrine: Negative for cold intolerance, heat intolerance, polydipsia and polyuria.   Genitourinary:  Negative for difficulty urinating, hematuria and urgency.   Musculoskeletal:  Negative for joint swelling and myalgias.   Skin:  Negative for rash.   Allergic/Immunologic: Negative for environmental allergies and immunocompromised state.   Neurological:  Negative for dizziness, seizures and weakness.   Hematological:  Negative for adenopathy. Does not bruise/bleed easily.   Psychiatric/Behavioral:  Negative for agitation, confusion and hallucinations.        Objective   Physical Exam  Constitutional:       Appearance: Normal appearance.   HENT:      Head: Normocephalic and atraumatic.      Right Ear: Tympanic membrane normal.      Left Ear: Tympanic membrane normal.      Nose: Nose normal.      Mouth/Throat:       Mouth: Mucous membranes are moist.      Pharynx: Oropharynx is clear.   Eyes:      Extraocular Movements: Extraocular movements intact.      Conjunctiva/sclera: Conjunctivae normal.      Pupils: Pupils are equal, round, and reactive to light.   Cardiovascular:      Rate and Rhythm: Normal rate and regular rhythm.   Pulmonary:      Effort: Pulmonary effort is normal.      Breath sounds: Normal breath sounds.   Abdominal:      General: Abdomen is flat. Bowel sounds are normal.      Palpations: Abdomen is soft.   Skin:     General: Skin is warm and dry.   Neurological:      Mental Status: He is alert.   Psychiatric:         Mood and Affect: Mood normal.         Behavior: Behavior normal.         Assessment/Plan   Problem List Items Addressed This Visit       Type 2 diabetes mellitus without complication, without long-term current use of insulin (CMS/[facility]) - Primary    Relevant Medications    metFORMIN (Glucophage) 1,000 mg tablet    Other Relevant Orders    POCT glycosylated hemoglobin (Hb A1C) manually resulted (Completed)    Lipid Panel    POCT glycosylated hemoglobin (Hb A1C) manually resulted    Albumin , Urine Random    CBC    Comprehensive Metabolic Panel    Hepatitis C antibody    HIV 1/2 Antigen/Antibody Screen with Reflex to Confirmation    Urinalysis with Reflex Microscopic    Healthcare maintenance    Relevant Orders    Lipid Panel    Albumin , Urine Random    CBC    Comprehensive Metabolic Panel    Hepatitis C antibody    HIV 1/2 Antigen/Antibody Screen with Reflex to Confirmation    Urinalysis with Reflex Microscopic

## 2024-02-07 ENCOUNTER — PATIENT MESSAGE (OUTPATIENT)
Dept: PRIMARY CARE | Facility: CLINIC | Age: 44
End: 2024-02-07
Payer: COMMERCIAL

## 2024-02-07 DIAGNOSIS — E78.5 HYPERLIPIDEMIA, UNSPECIFIED HYPERLIPIDEMIA TYPE: Primary | ICD-10-CM

## 2024-02-07 LAB
ALBUMIN SERPL BCP-MCNC: 4.4 G/DL (ref 3.4–5)
ALP SERPL-CCNC: 51 U/L (ref 33–120)
ALT SERPL W P-5'-P-CCNC: 31 U/L (ref 10–52)
ANION GAP SERPL CALC-SCNC: 12 MMOL/L (ref 10–20)
APPEARANCE UR: CLEAR
AST SERPL W P-5'-P-CCNC: 16 U/L (ref 9–39)
BILIRUB SERPL-MCNC: 0.5 MG/DL (ref 0–1.2)
BILIRUB UR STRIP.AUTO-MCNC: NEGATIVE MG/DL
BUN SERPL-MCNC: 13 MG/DL (ref 6–23)
CALCIUM SERPL-MCNC: 9.3 MG/DL (ref 8.6–10.6)
CHLORIDE SERPL-SCNC: 103 MMOL/L (ref 98–107)
CHOLEST SERPL-MCNC: 216 MG/DL (ref 0–199)
CHOLESTEROL/HDL RATIO: 6.1
CO2 SERPL-SCNC: 25 MMOL/L (ref 21–32)
COLOR UR: NORMAL
CREAT SERPL-MCNC: 0.85 MG/DL (ref 0.5–1.3)
CREAT UR-MCNC: 108.7 MG/DL (ref 20–370)
EGFRCR SERPLBLD CKD-EPI 2021: >90 ML/MIN/1.73M*2
ERYTHROCYTE [DISTWIDTH] IN BLOOD BY AUTOMATED COUNT: 12.5 % (ref 11.5–14.5)
GLUCOSE SERPL-MCNC: 130 MG/DL (ref 74–99)
GLUCOSE UR STRIP.AUTO-MCNC: NORMAL MG/DL
HCT VFR BLD AUTO: 49 % (ref 41–52)
HCV AB SER QL: NONREACTIVE
HDLC SERPL-MCNC: 35.2 MG/DL
HGB BLD-MCNC: 16.6 G/DL (ref 13.5–17.5)
HIV 1+2 AB+HIV1 P24 AG SERPL QL IA: NONREACTIVE
KETONES UR STRIP.AUTO-MCNC: NEGATIVE MG/DL
LDLC SERPL CALC-MCNC: 128 MG/DL
LEUKOCYTE ESTERASE UR QL STRIP.AUTO: NEGATIVE
MCH RBC QN AUTO: 29 PG (ref 26–34)
MCHC RBC AUTO-ENTMCNC: 33.9 G/DL (ref 32–36)
MCV RBC AUTO: 86 FL (ref 80–100)
MICROALBUMIN UR-MCNC: <7 MG/L
MICROALBUMIN/CREAT UR: NORMAL MG/G{CREAT}
NITRITE UR QL STRIP.AUTO: NEGATIVE
NON HDL CHOLESTEROL: 181 MG/DL (ref 0–149)
NRBC BLD-RTO: 0 /100 WBCS (ref 0–0)
PH UR STRIP.AUTO: 6 [PH]
PLATELET # BLD AUTO: 248 X10*3/UL (ref 150–450)
POTASSIUM SERPL-SCNC: 4.2 MMOL/L (ref 3.5–5.3)
PROT SERPL-MCNC: 7.2 G/DL (ref 6.4–8.2)
PROT UR STRIP.AUTO-MCNC: NEGATIVE MG/DL
RBC # BLD AUTO: 5.73 X10*6/UL (ref 4.5–5.9)
RBC # UR STRIP.AUTO: NEGATIVE /UL
SODIUM SERPL-SCNC: 136 MMOL/L (ref 136–145)
SP GR UR STRIP.AUTO: 1.02
TRIGL SERPL-MCNC: 264 MG/DL (ref 0–149)
UROBILINOGEN UR STRIP.AUTO-MCNC: NORMAL MG/DL
VLDL: 53 MG/DL (ref 0–40)
WBC # BLD AUTO: 9.8 X10*3/UL (ref 4.4–11.3)

## 2024-02-07 RX ORDER — ROSUVASTATIN CALCIUM 20 MG/1
20 TABLET, COATED ORAL DAILY
Qty: 30 TABLET | Refills: 3 | Status: SHIPPED | OUTPATIENT
Start: 2024-02-07 | End: 2024-05-07 | Stop reason: SINTOL

## 2024-02-08 ENCOUNTER — APPOINTMENT (OUTPATIENT)
Dept: PRIMARY CARE | Facility: CLINIC | Age: 44
End: 2024-02-08
Payer: COMMERCIAL

## 2024-02-20 ENCOUNTER — TELEMEDICINE (OUTPATIENT)
Dept: PHARMACY | Facility: HOSPITAL | Age: 44
End: 2024-02-20
Payer: COMMERCIAL

## 2024-02-20 DIAGNOSIS — E11.9 TYPE 2 DIABETES MELLITUS WITHOUT COMPLICATION, WITHOUT LONG-TERM CURRENT USE OF INSULIN (MULTI): ICD-10-CM

## 2024-02-20 ASSESSMENT — ENCOUNTER SYMPTOMS
BLURRED VISION: 1
VISUAL CHANGE: 1

## 2024-02-20 NOTE — PROGRESS NOTES
Subjective     Patient ID: Huber Owens is a 43 y.o. male who presents for diabetes management.    Diabetes  He presents for his follow-up diabetic visit. He has type 2 diabetes mellitus. There are no hypoglycemic associated symptoms. Associated symptoms include blurred vision and visual change. There are no hypoglycemic complications. Diabetic complications include autonomic neuropathy and retinopathy. Risk factors for coronary artery disease include diabetes mellitus, obesity and male sex. Current diabetic treatment includes oral agent (monotherapy). His home blood glucose trend is decreasing steadily. His overall blood glucose range is 140-180 mg/dl. An ACE inhibitor/angiotensin II receptor blocker is not being taken. He does not see a podiatrist.Eye exam is current.     No Known Allergies    Objective     Current DM Pharmacotherapy:   -metformin 1000mg 1 po bid    SECONDARY PREVENTION  - Statin? No  - ACE-I/ARB? No  - Aspirin? No    Current monitoring regimen:   Patient is using: glucometer    SMBG Readings Fastin to 150 patient did not have log with him    Any episodes of hypoglycemia? No  Hypoglycemia awareness? No    Pertinent PMH Review:  - PMH of Pancreatitis: No  - PMH/FH of Medullary Thyroid Cancer: Yes  - PMH of Retinopathy: Yes (following with opthalmology)  - PMH of Urinary Tract Infections: No    Lab Review  Lab Results   Component Value Date    BILITOT 0.5 2024    CALCIUM 9.3 2024    CO2 25 2024     2024    CREATININE 0.85 2024    GLUCOSE 130 (H) 2024    ALKPHOS 51 2024    K 4.2 2024    PROT 7.2 2024     2024    AST 16 2024    ALT 31 2024    BUN 13 2024    ANIONGAP 12 2024    ALBUMIN 4.4 2024     Lab Results   Component Value Date    TRIG 264 (H) 2024    CHOL 216 (H) 2024    LDLCALC 128 (H) 2024    HDL 35.2 2024     Lab Results   Component Value Date    HGBA1C 7.1  (A) 02/06/2024     The 10-year ASCVD risk score (Taras ZUNIGA, et al., 2019) is: 14.1%    Values used to calculate the score:      Age: 43 years      Sex: Male      Is Non- : No      Diabetic: Yes      Tobacco smoker: Yes      Systolic Blood Pressure: 124 mmHg      Is BP treated: No      HDL Cholesterol: 35.2 mg/dL      Total Cholesterol: 216 mg/dL    Assessment/Plan     Problem List Items Addressed This Visit       Type 2 diabetes mellitus without complication, without long-term current use of insulin (CMS/[facility])   His updated A1C almost has him in goal. Will recommend no changes to therapy at this time and follow up after next A1C. Patient states his FBG is still above goal, but his postprandial is all in goal. Discovered patient usually eats at 9 or 10 pm at night and will test at 5 am before he goes to work. Discussed trying to wait to test until 8 am to get a better fasting reading.    Type 2 diabetes mellitus, is not at goal. <7%    Follow up: I recommend diabetes care be 12 weeks or sooner per patient preference.    Mela Barahona, PharmD    Continue all meds under the continuation of care with the referring provider and clinical pharmacy team

## 2024-04-24 ENCOUNTER — PATIENT MESSAGE (OUTPATIENT)
Dept: PRIMARY CARE | Facility: CLINIC | Age: 44
End: 2024-04-24
Payer: COMMERCIAL

## 2024-04-24 DIAGNOSIS — E11.9 TYPE 2 DIABETES MELLITUS WITHOUT COMPLICATION, WITHOUT LONG-TERM CURRENT USE OF INSULIN (MULTI): Primary | ICD-10-CM

## 2024-04-25 RX ORDER — ATORVASTATIN CALCIUM 20 MG/1
20 TABLET, FILM COATED ORAL DAILY
Qty: 30 TABLET | Refills: 3 | Status: SHIPPED | OUTPATIENT
Start: 2024-04-25 | End: 2025-05-30

## 2024-05-07 ENCOUNTER — OFFICE VISIT (OUTPATIENT)
Dept: PRIMARY CARE | Facility: CLINIC | Age: 44
End: 2024-05-07
Payer: COMMERCIAL

## 2024-05-07 VITALS
BODY MASS INDEX: 31.17 KG/M2 | DIASTOLIC BLOOD PRESSURE: 84 MMHG | RESPIRATION RATE: 16 BRPM | OXYGEN SATURATION: 99 % | SYSTOLIC BLOOD PRESSURE: 126 MMHG | WEIGHT: 199 LBS | HEART RATE: 92 BPM | TEMPERATURE: 98.4 F

## 2024-05-07 DIAGNOSIS — Z00.00 HEALTHCARE MAINTENANCE: ICD-10-CM

## 2024-05-07 DIAGNOSIS — E11.9 TYPE 2 DIABETES MELLITUS WITHOUT COMPLICATION, WITHOUT LONG-TERM CURRENT USE OF INSULIN (MULTI): Primary | ICD-10-CM

## 2024-05-07 DIAGNOSIS — E78.00 HIGH CHOLESTEROL: ICD-10-CM

## 2024-05-07 LAB — POC HEMOGLOBIN A1C: 7.8 % (ref 4.2–6.5)

## 2024-05-07 PROCEDURE — 4004F PT TOBACCO SCREEN RCVD TLK: CPT | Performed by: FAMILY MEDICINE

## 2024-05-07 PROCEDURE — 3074F SYST BP LT 130 MM HG: CPT | Performed by: FAMILY MEDICINE

## 2024-05-07 PROCEDURE — 3079F DIAST BP 80-89 MM HG: CPT | Performed by: FAMILY MEDICINE

## 2024-05-07 PROCEDURE — 99214 OFFICE O/P EST MOD 30 MIN: CPT | Performed by: FAMILY MEDICINE

## 2024-05-07 PROCEDURE — 3049F LDL-C 100-129 MG/DL: CPT | Performed by: FAMILY MEDICINE

## 2024-05-07 PROCEDURE — 83036 HEMOGLOBIN GLYCOSYLATED A1C: CPT | Performed by: FAMILY MEDICINE

## 2024-05-07 PROCEDURE — 3062F POS MACROALBUMINURIA REV: CPT | Performed by: FAMILY MEDICINE

## 2024-05-07 NOTE — PROGRESS NOTES
Subjective   Patient ID: Huber Owens is a 43 y.o. male who presents for Diabetes.  HPI    Patient states his sugars were more elevated on the rosuvastatin so we switched to atorvastatin which she did about a week ago.  Seems to be tolerating this medication well with good adherence  He denies any chest pain shortness of breath, muscle pain or weakness.    Patient states that she is tolerating diabetes medications well with good adherence.  They deny any symptoms of hyper or hypoglycemia.  He had 1 episode where he noticed his sugar was over 300 but was back to normal the next day.  It is unclear as this cause.  He states that he took his medications and did not eat or drink anything unusual.        Review of Systems    Objective   Physical Exam  Constitutional:       Appearance: Normal appearance.   Cardiovascular:      Rate and Rhythm: Normal rate and regular rhythm.   Pulmonary:      Effort: Pulmonary effort is normal.      Breath sounds: Normal breath sounds.   Abdominal:      General: Abdomen is flat. Bowel sounds are normal.      Palpations: Abdomen is soft.   Skin:     General: Skin is warm and dry.   Neurological:      Mental Status: He is alert.   Psychiatric:         Mood and Affect: Mood normal.         Behavior: Behavior normal.         Assessment/Plan   Problem List Items Addressed This Visit       Type 2 diabetes mellitus without complication, without long-term current use of insulin (Multi) - Primary    Relevant Orders    Hemoglobin A1C    Lipid Panel    Comprehensive Metabolic Panel    Healthcare maintenance    Relevant Orders    Hemoglobin A1C    Lipid Panel    Comprehensive Metabolic Panel     Other Visit Diagnoses       High cholesterol        Relevant Orders    Hemoglobin A1C    Lipid Panel    Comprehensive Metabolic Panel          Follow-up in 3 months, fasting labs 1 week prior

## 2024-05-14 ENCOUNTER — TELEMEDICINE (OUTPATIENT)
Dept: PHARMACY | Facility: HOSPITAL | Age: 44
End: 2024-05-14
Payer: COMMERCIAL

## 2024-05-14 DIAGNOSIS — E11.9 TYPE 2 DIABETES MELLITUS WITHOUT COMPLICATION, WITHOUT LONG-TERM CURRENT USE OF INSULIN (MULTI): Primary | ICD-10-CM

## 2024-05-15 ASSESSMENT — ENCOUNTER SYMPTOMS
VISUAL CHANGE: 1
BLURRED VISION: 1

## 2024-05-15 NOTE — PROGRESS NOTES
Subjective     Patient ID: Huber Owens is a 43 y.o. male who presents for diabetes management.    Diabetes  He presents for his follow-up diabetic visit. He has type 2 diabetes mellitus. There are no hypoglycemic associated symptoms. Associated symptoms include blurred vision and visual change. There are no hypoglycemic complications. Diabetic complications include autonomic neuropathy and retinopathy. Risk factors for coronary artery disease include diabetes mellitus, obesity and male sex. Current diabetic treatment includes oral agent (monotherapy). His home blood glucose trend is decreasing steadily. His overall blood glucose range is 140-180 mg/dl. An ACE inhibitor/angiotensin II receptor blocker is not being taken. He does not see a podiatrist.Eye exam is current.     Allergies   Allergen Reactions    Rosuvastatin Other     Higher blood sugars       Objective     Current DM Pharmacotherapy:   -metformin 1000mg 1 po bid    SECONDARY PREVENTION  - Statin? No  - ACE-I/ARB? No  - Aspirin? No    Current monitoring regimen:   Patient is using: glucometer    SMBG Readings Fastin to 180    Any episodes of hypoglycemia? No  Hypoglycemia awareness? No    Pertinent PMH Review:  - PMH of Pancreatitis: No  - PMH/FH of Medullary Thyroid Cancer: Yes  - PMH of Retinopathy: Yes (following with opthalmology)  - PMH of Urinary Tract Infections: No    Lab Review  Lab Results   Component Value Date    BILITOT 0.5 2024    CALCIUM 9.3 2024    CO2 25 2024     2024    CREATININE 0.85 2024    GLUCOSE 130 (H) 2024    ALKPHOS 51 2024    K 4.2 2024    PROT 7.2 2024     2024    AST 16 2024    ALT 31 2024    BUN 13 2024    ANIONGAP 12 2024    ALBUMIN 4.4 2024     Lab Results   Component Value Date    TRIG 264 (H) 2024    CHOL 216 (H) 2024    LDLCALC 128 (H) 2024    HDL 35.2 2024     Lab Results   Component  Value Date    HGBA1C 7.8 (A) 05/07/2024     The 10-year ASCVD risk score (Taras ZUNIGA, et al., 2019) is: 14.5%    Values used to calculate the score:      Age: 43 years      Sex: Male      Is Non- : No      Diabetic: Yes      Tobacco smoker: Yes      Systolic Blood Pressure: 126 mmHg      Is BP treated: No      HDL Cholesterol: 35.2 mg/dL      Total Cholesterol: 216 mg/dL    Assessment/Plan   A1C has increased since last visit. Patient is trying a different statin to try to help get BG down. Will follow up in 3 weeks to review BG log. If readings still out of goal, will consider adding on a GLP-1 or a SGLT-2.    Type 2 diabetes mellitus, is not at goal. <7%    Follow up: I recommend diabetes care be 3 weeks    Mela Barahona, PharmD    Continue all meds under the continuation of care with the referring provider and clinical pharmacy team

## 2024-05-28 ENCOUNTER — APPOINTMENT (OUTPATIENT)
Dept: PHARMACY | Facility: HOSPITAL | Age: 44
End: 2024-05-28
Payer: COMMERCIAL

## 2024-05-29 ENCOUNTER — TELEMEDICINE (OUTPATIENT)
Dept: PHARMACY | Facility: HOSPITAL | Age: 44
End: 2024-05-29
Payer: COMMERCIAL

## 2024-05-29 DIAGNOSIS — E11.9 TYPE 2 DIABETES MELLITUS WITHOUT COMPLICATION, WITHOUT LONG-TERM CURRENT USE OF INSULIN (MULTI): ICD-10-CM

## 2024-05-29 RX ORDER — TIRZEPATIDE 2.5 MG/.5ML
2.5 INJECTION, SOLUTION SUBCUTANEOUS
Qty: 2 ML | Refills: 0 | Status: SHIPPED | OUTPATIENT
Start: 2024-06-02

## 2024-05-29 ASSESSMENT — ENCOUNTER SYMPTOMS
BLURRED VISION: 1
VISUAL CHANGE: 1

## 2024-05-29 NOTE — PROGRESS NOTES
Subjective     Patient ID: Huber Owens is a 43 y.o. male who presents for diabetes management.    Diabetes  He presents for his follow-up diabetic visit. He has type 2 diabetes mellitus. There are no hypoglycemic associated symptoms. Associated symptoms include blurred vision and visual change. There are no hypoglycemic complications. Diabetic complications include autonomic neuropathy and retinopathy. Risk factors for coronary artery disease include diabetes mellitus, obesity and male sex. Current diabetic treatment includes oral agent (monotherapy). His home blood glucose trend is decreasing steadily. His overall blood glucose range is 140-180 mg/dl. An ACE inhibitor/angiotensin II receptor blocker is not being taken. He does not see a podiatrist.Eye exam is current.     Allergies   Allergen Reactions    Rosuvastatin Other     Higher blood sugars       Objective     Current DM Pharmacotherapy:   -metformin 1000mg 1 po bid    SECONDARY PREVENTION  - Statin? No  - ACE-I/ARB? No  - Aspirin? No    Current monitoring regimen:   Patient is using: glucometer    SMBG Readings Fastin, 200, 225, 210, 269, 206, 220, 194, 200, 210, 171, 166    Any episodes of hypoglycemia? No  Hypoglycemia awareness? No    Pertinent PMH Review:  - PMH of Pancreatitis: No  - PMH/FH of Medullary Thyroid Cancer: Yes  - PMH of Retinopathy: Yes (following with opthalmology)  - PMH of Urinary Tract Infections: No    Lab Review  Lab Results   Component Value Date    BILITOT 0.5 2024    CALCIUM 9.3 2024    CO2 25 2024     2024    CREATININE 0.85 2024    GLUCOSE 130 (H) 2024    ALKPHOS 51 2024    K 4.2 2024    PROT 7.2 2024     2024    AST 16 2024    ALT 31 2024    BUN 13 2024    ANIONGAP 12 2024    ALBUMIN 4.4 2024     Lab Results   Component Value Date    TRIG 264 (H) 2024    CHOL 216 (H) 2024    LDLCALC 128 (H) 2024     HDL 35.2 02/06/2024     Lab Results   Component Value Date    HGBA1C 7.8 (A) 05/07/2024     The 10-year ASCVD risk score (Taras ZUNIGA, et al., 2019) is: 14.5%    Values used to calculate the score:      Age: 43 years      Sex: Male      Is Non- : No      Diabetic: Yes      Tobacco smoker: Yes      Systolic Blood Pressure: 126 mmHg      Is BP treated: No      HDL Cholesterol: 35.2 mg/dL      Total Cholesterol: 216 mg/dL    Assessment/Plan   Fasting BG is still out of goal. Will add on a new DM medication at this time. Patient is interested in starting Mounjaro for weight loss and DM management. Reviewed injection technique and answered all patient questions and concerns. Will start Mounjaro 2.5 mg weekly and will follow up in 3 weeks for titration.        Mounjaro Education:    - Counseled patient on Mounjaro MOA, expectations, side effects, duration of therapy, administration, and monitoring parameters.  - Provided detailed dosing and administration counseling to ensure proper technique.   - Reviewed Mounjaro titration schedule, starting with 2.5 mg once weekly to 5 mg, 7.5 mg, 10 mg, 12.5 mg and if tolerated 15 mg.  - Counseled patient on the benefits of GiP, such as cardiovascular risk reduction, glycemic control, and weight loss potential.  - Reviewed storage requirements of Mounjaro when not in use, and when to administer the medication if a dose is missed.  - Advised patient that they may experience improved satiety after meals and portion sizes of meals may be reduced as doses of Mounjaro increase.      Type 2 diabetes mellitus, is not at goal. <7%    START Mounjaro 2.5 mg weekly  Follow up: I recommend diabetes care be 3 weeks    Mela Barahona, PharmD    Continue all meds under the continuation of care with the referring provider and clinical pharmacy team

## 2024-06-19 ENCOUNTER — APPOINTMENT (OUTPATIENT)
Dept: PHARMACY | Facility: HOSPITAL | Age: 44
End: 2024-06-19
Payer: COMMERCIAL

## 2024-06-19 DIAGNOSIS — E11.9 TYPE 2 DIABETES MELLITUS WITHOUT COMPLICATION, WITHOUT LONG-TERM CURRENT USE OF INSULIN (MULTI): Primary | ICD-10-CM

## 2024-06-19 RX ORDER — TIRZEPATIDE 5 MG/.5ML
5 INJECTION, SOLUTION SUBCUTANEOUS
Qty: 2 ML | Refills: 0 | Status: SHIPPED | OUTPATIENT
Start: 2024-06-23 | End: 2024-06-25 | Stop reason: SDUPTHER

## 2024-06-19 NOTE — PROGRESS NOTES
Subjective     Patient ID: Huber Owens is a 43 y.o. male who presents for diabetes management.    Diabetes  He presents for his follow-up diabetic visit. He has type 2 diabetes mellitus. There are no hypoglycemic associated symptoms. Associated symptoms include blurred vision and visual change. There are no hypoglycemic complications. Diabetic complications include autonomic neuropathy and retinopathy. Risk factors for coronary artery disease include diabetes mellitus, obesity and male sex. Current diabetic treatment includes oral agent (monotherapy). His home blood glucose trend is decreasing steadily. His overall blood glucose range is 140-180 mg/dl. An ACE inhibitor/angiotensin II receptor blocker is not being taken. He does not see a podiatrist.Eye exam is current.     Allergies   Allergen Reactions    Rosuvastatin Other     Higher blood sugars       Objective     Current DM Pharmacotherapy:   -metformin 1000mg 1 po bid    SECONDARY PREVENTION  - Statin? No  - ACE-I/ARB? No  - Aspirin? No    Current monitoring regimen:   Patient is using: glucometer    SMBG Readings Fasting:  160 156 166 154 147 143 158 158 173 163 172 189 176    Any episodes of hypoglycemia? No  Hypoglycemia awareness? No    Pertinent PMH Review:  - PMH of Pancreatitis: No  - PMH/FH of Medullary Thyroid Cancer: Yes  - PMH of Retinopathy: Yes (following with opthalmology)  - PMH of Urinary Tract Infections: No    Lab Review  Lab Results   Component Value Date    BILITOT 0.5 02/06/2024    CALCIUM 9.3 02/06/2024    CO2 25 02/06/2024     02/06/2024    CREATININE 0.85 02/06/2024    GLUCOSE 130 (H) 02/06/2024    ALKPHOS 51 02/06/2024    K 4.2 02/06/2024    PROT 7.2 02/06/2024     02/06/2024    AST 16 02/06/2024    ALT 31 02/06/2024    BUN 13 02/06/2024    ANIONGAP 12 02/06/2024    ALBUMIN 4.4 02/06/2024     Lab Results   Component Value Date    TRIG 264 (H) 02/06/2024    CHOL 216 (H) 02/06/2024    LDLCALC 128 (H) 02/06/2024    HDL  35.2 02/06/2024     Lab Results   Component Value Date    HGBA1C 7.8 (A) 05/07/2024     The 10-year ASCVD risk score (Taras DK, et al., 2019) is: 14.5%    Values used to calculate the score:      Age: 43 years      Sex: Male      Is Non- : No      Diabetic: Yes      Tobacco smoker: Yes      Systolic Blood Pressure: 126 mmHg      Is BP treated: No      HDL Cholesterol: 35.2 mg/dL      Total Cholesterol: 216 mg/dL    Assessment/Plan   Patient had some nausea and constipation with starting Mounjaro, but reports it has gotten better over the last week. Is very happy with progress so far. BG average has gone down and patient has lost about 8lbs so far. Will increase Mounjaro to 5 mg weekly and will follow up in 4 weeks for titration.    Type 2 diabetes mellitus, is not at goal. <7%    INCREASE Mounjaro to 5 mg weekly  Follow up: I recommend diabetes care be 4 weeks    Mela Barahona, PharmD    Continue all meds under the continuation of care with the referring provider and clinical pharmacy team

## 2024-06-24 ASSESSMENT — ENCOUNTER SYMPTOMS
BLURRED VISION: 1
VISUAL CHANGE: 1

## 2024-06-25 DIAGNOSIS — E11.9 TYPE 2 DIABETES MELLITUS WITHOUT COMPLICATION, WITHOUT LONG-TERM CURRENT USE OF INSULIN (MULTI): ICD-10-CM

## 2024-06-25 PROCEDURE — RXMED WILLOW AMBULATORY MEDICATION CHARGE

## 2024-06-25 RX ORDER — TIRZEPATIDE 5 MG/.5ML
5 INJECTION, SOLUTION SUBCUTANEOUS
Qty: 2 ML | Refills: 0 | Status: SHIPPED | OUTPATIENT
Start: 2024-06-25

## 2024-06-28 ENCOUNTER — PHARMACY VISIT (OUTPATIENT)
Dept: PHARMACY | Facility: CLINIC | Age: 44
End: 2024-06-28
Payer: MEDICARE

## 2024-06-29 DIAGNOSIS — E11.9 TYPE 2 DIABETES MELLITUS WITHOUT COMPLICATION, WITHOUT LONG-TERM CURRENT USE OF INSULIN (MULTI): ICD-10-CM

## 2024-07-02 RX ORDER — METFORMIN HYDROCHLORIDE 1000 MG/1
1000 TABLET ORAL
Qty: 180 TABLET | Refills: 1 | Status: SHIPPED | OUTPATIENT
Start: 2024-07-02

## 2024-07-17 ENCOUNTER — APPOINTMENT (OUTPATIENT)
Dept: PHARMACY | Facility: HOSPITAL | Age: 44
End: 2024-07-17
Payer: COMMERCIAL

## 2024-07-17 DIAGNOSIS — E11.9 TYPE 2 DIABETES MELLITUS WITHOUT COMPLICATION, WITHOUT LONG-TERM CURRENT USE OF INSULIN (MULTI): Primary | ICD-10-CM

## 2024-07-17 PROCEDURE — RXMED WILLOW AMBULATORY MEDICATION CHARGE

## 2024-07-17 RX ORDER — TIRZEPATIDE 7.5 MG/.5ML
7.5 INJECTION, SOLUTION SUBCUTANEOUS
Qty: 2 ML | Refills: 0 | Status: SHIPPED | OUTPATIENT
Start: 2024-07-21

## 2024-07-17 NOTE — PROGRESS NOTES
Subjective     Patient ID: Huber Owens is a 44 y.o. male who presents for diabetes management.    Diabetes  He presents for his follow-up diabetic visit. He has type 2 diabetes mellitus. There are no hypoglycemic associated symptoms. Associated symptoms include blurred vision and visual change. There are no hypoglycemic complications. Diabetic complications include autonomic neuropathy and retinopathy. Risk factors for coronary artery disease include diabetes mellitus, obesity and male sex. Current diabetic treatment includes oral agent (monotherapy). His home blood glucose trend is decreasing steadily. His overall blood glucose range is 140-180 mg/dl. An ACE inhibitor/angiotensin II receptor blocker is not being taken. He does not see a podiatrist.Eye exam is current.     Allergies   Allergen Reactions    Rosuvastatin Other     Higher blood sugars     Objective     Current DM Pharmacotherapy:   -metformin 1000mg 1 po bid  Mounjaro 5 mg weekly    SECONDARY PREVENTION  - Statin? No  - ACE-I/ARB? No  - Aspirin? No    Current monitoring regimen:   Patient is using: glucometer    SMBG Readings Fastin to 125   Midday 90    Any episodes of hypoglycemia? No  Hypoglycemia awareness? No    Pertinent PMH Review:  - PMH of Pancreatitis: No  - PMH/FH of Medullary Thyroid Cancer: Yes  - PMH of Retinopathy: Yes (following with opthalmology)  - PMH of Urinary Tract Infections: No    Lab Review  Lab Results   Component Value Date    BILITOT 0.5 2024    CALCIUM 9.3 2024    CO2 25 2024     2024    CREATININE 0.85 2024    GLUCOSE 130 (H) 2024    ALKPHOS 51 2024    K 4.2 2024    PROT 7.2 2024     2024    AST 16 2024    ALT 31 2024    BUN 13 2024    ANIONGAP 12 2024    ALBUMIN 4.4 2024     Lab Results   Component Value Date    TRIG 264 (H) 2024    CHOL 216 (H) 2024    LDLCALC 128 (H) 2024    HDL 35.2  02/06/2024     Lab Results   Component Value Date    HGBA1C 7.8 (A) 05/07/2024     The 10-year ASCVD risk score (Taras DK, et al., 2019) is: 15.3%    Values used to calculate the score:      Age: 44 years      Sex: Male      Is Non- : No      Diabetic: Yes      Tobacco smoker: Yes      Systolic Blood Pressure: 126 mmHg      Is BP treated: No      HDL Cholesterol: 35.2 mg/dL      Total Cholesterol: 216 mg/dL    Assessment/Plan   Patient did well with increased dose of Mounjaro. States BG is good, but weight loss has slowed. Will increase Mounjaro to 7.5 mg weekly and will follow up in 4 weeks for titration.    Type 2 diabetes mellitus, is not at goal. <7%    INCREASE Mounjaro to 7.5 mg weekly  Follow up: I recommend diabetes care be 4 weeks    Mela Barahona, PharmD    Continue all meds under the continuation of care with the referring provider and clinical pharmacy team

## 2024-07-18 ASSESSMENT — ENCOUNTER SYMPTOMS
BLURRED VISION: 1
VISUAL CHANGE: 1

## 2024-07-20 ENCOUNTER — PHARMACY VISIT (OUTPATIENT)
Dept: PHARMACY | Facility: CLINIC | Age: 44
End: 2024-07-20
Payer: MEDICARE

## 2024-08-07 ENCOUNTER — APPOINTMENT (OUTPATIENT)
Dept: PRIMARY CARE | Facility: CLINIC | Age: 44
End: 2024-08-07
Payer: COMMERCIAL

## 2024-08-09 ENCOUNTER — CLINICAL SUPPORT (OUTPATIENT)
Dept: PRIMARY CARE | Facility: CLINIC | Age: 44
End: 2024-08-09
Payer: COMMERCIAL

## 2024-08-09 DIAGNOSIS — E11.9 TYPE 2 DIABETES MELLITUS WITHOUT COMPLICATION, WITHOUT LONG-TERM CURRENT USE OF INSULIN (MULTI): ICD-10-CM

## 2024-08-09 DIAGNOSIS — Z00.00 HEALTHCARE MAINTENANCE: ICD-10-CM

## 2024-08-09 DIAGNOSIS — E78.00 HIGH CHOLESTEROL: ICD-10-CM

## 2024-08-09 LAB
ALBUMIN SERPL BCP-MCNC: 4.6 G/DL (ref 3.4–5)
ALP SERPL-CCNC: 56 U/L (ref 33–120)
ALT SERPL W P-5'-P-CCNC: 47 U/L (ref 10–52)
ANION GAP SERPL CALC-SCNC: 14 MMOL/L (ref 10–20)
AST SERPL W P-5'-P-CCNC: 26 U/L (ref 9–39)
BILIRUB SERPL-MCNC: 0.7 MG/DL (ref 0–1.2)
BUN SERPL-MCNC: 17 MG/DL (ref 6–23)
CALCIUM SERPL-MCNC: 9.8 MG/DL (ref 8.6–10.6)
CHLORIDE SERPL-SCNC: 104 MMOL/L (ref 98–107)
CHOLEST SERPL-MCNC: 138 MG/DL (ref 0–199)
CHOLESTEROL/HDL RATIO: 3.9
CO2 SERPL-SCNC: 19 MMOL/L (ref 21–32)
CREAT SERPL-MCNC: 1.03 MG/DL (ref 0.5–1.3)
EGFRCR SERPLBLD CKD-EPI 2021: >90 ML/MIN/1.73M*2
EST. AVERAGE GLUCOSE BLD GHB EST-MCNC: 134 MG/DL
GLUCOSE SERPL-MCNC: 108 MG/DL (ref 74–99)
HBA1C MFR BLD: 6.3 %
HDLC SERPL-MCNC: 35 MG/DL
LDLC SERPL CALC-MCNC: 71 MG/DL
NON HDL CHOLESTEROL: 103 MG/DL (ref 0–149)
POTASSIUM SERPL-SCNC: 4.4 MMOL/L (ref 3.5–5.3)
PROT SERPL-MCNC: 7.6 G/DL (ref 6.4–8.2)
SODIUM SERPL-SCNC: 133 MMOL/L (ref 136–145)
TRIGL SERPL-MCNC: 160 MG/DL (ref 0–149)
VLDL: 32 MG/DL (ref 0–40)

## 2024-08-09 PROCEDURE — 83036 HEMOGLOBIN GLYCOSYLATED A1C: CPT

## 2024-08-09 PROCEDURE — 36415 COLL VENOUS BLD VENIPUNCTURE: CPT

## 2024-08-09 PROCEDURE — 80061 LIPID PANEL: CPT

## 2024-08-09 PROCEDURE — 80053 COMPREHEN METABOLIC PANEL: CPT

## 2024-08-14 ENCOUNTER — TELEMEDICINE (OUTPATIENT)
Dept: PHARMACY | Facility: HOSPITAL | Age: 44
End: 2024-08-14
Payer: COMMERCIAL

## 2024-08-14 ENCOUNTER — APPOINTMENT (OUTPATIENT)
Dept: PRIMARY CARE | Facility: CLINIC | Age: 44
End: 2024-08-14
Payer: COMMERCIAL

## 2024-08-14 VITALS
DIASTOLIC BLOOD PRESSURE: 86 MMHG | TEMPERATURE: 97.6 F | RESPIRATION RATE: 16 BRPM | SYSTOLIC BLOOD PRESSURE: 123 MMHG | WEIGHT: 185.2 LBS | OXYGEN SATURATION: 99 % | HEART RATE: 80 BPM | BODY MASS INDEX: 29.01 KG/M2

## 2024-08-14 DIAGNOSIS — E11.9 TYPE 2 DIABETES MELLITUS WITHOUT COMPLICATION, WITHOUT LONG-TERM CURRENT USE OF INSULIN (MULTI): Primary | ICD-10-CM

## 2024-08-14 DIAGNOSIS — E78.00 HIGH CHOLESTEROL: ICD-10-CM

## 2024-08-14 DIAGNOSIS — E11.9 TYPE 2 DIABETES MELLITUS WITHOUT COMPLICATION, WITHOUT LONG-TERM CURRENT USE OF INSULIN (MULTI): ICD-10-CM

## 2024-08-14 PROCEDURE — 3062F POS MACROALBUMINURIA REV: CPT | Performed by: FAMILY MEDICINE

## 2024-08-14 PROCEDURE — RXMED WILLOW AMBULATORY MEDICATION CHARGE

## 2024-08-14 PROCEDURE — 3044F HG A1C LEVEL LT 7.0%: CPT | Performed by: FAMILY MEDICINE

## 2024-08-14 PROCEDURE — 4004F PT TOBACCO SCREEN RCVD TLK: CPT | Performed by: FAMILY MEDICINE

## 2024-08-14 PROCEDURE — 3079F DIAST BP 80-89 MM HG: CPT | Performed by: FAMILY MEDICINE

## 2024-08-14 PROCEDURE — 99214 OFFICE O/P EST MOD 30 MIN: CPT | Performed by: FAMILY MEDICINE

## 2024-08-14 PROCEDURE — 3074F SYST BP LT 130 MM HG: CPT | Performed by: FAMILY MEDICINE

## 2024-08-14 PROCEDURE — 3048F LDL-C <100 MG/DL: CPT | Performed by: FAMILY MEDICINE

## 2024-08-14 RX ORDER — ATORVASTATIN CALCIUM 20 MG/1
20 TABLET, FILM COATED ORAL DAILY
Qty: 90 TABLET | Refills: 3 | Status: SHIPPED | OUTPATIENT
Start: 2024-08-14 | End: 2024-08-14 | Stop reason: SDUPTHER

## 2024-08-14 RX ORDER — TIRZEPATIDE 7.5 MG/.5ML
7.5 INJECTION, SOLUTION SUBCUTANEOUS
Qty: 2 ML | Refills: 0 | Status: SHIPPED | OUTPATIENT
Start: 2024-08-18

## 2024-08-14 RX ORDER — ATORVASTATIN CALCIUM 20 MG/1
20 TABLET, FILM COATED ORAL DAILY
Qty: 90 TABLET | Refills: 3 | Status: SHIPPED | OUTPATIENT
Start: 2024-08-14 | End: 2025-08-09

## 2024-08-14 RX ORDER — TIRZEPATIDE 7.5 MG/.5ML
7.5 INJECTION, SOLUTION SUBCUTANEOUS
Qty: 2 ML | Refills: 0 | Status: CANCELLED | OUTPATIENT
Start: 2024-08-18

## 2024-08-14 ASSESSMENT — ENCOUNTER SYMPTOMS
VISUAL CHANGE: 1
BLURRED VISION: 1

## 2024-08-14 NOTE — PROGRESS NOTES
Subjective   Patient ID: Huber Owens is a 44 y.o. male who presents for Diabetes and Hyperlipidemia.  HPI    Patient states that she is tolerating diabetes medications well with good adherence.  They deny any symptoms of hyper or hypoglycemia.      Patient states they are  tolerating cholesterol medications well with good adherence.  They deny any chest pain shortness of breath, muscle pain or weakness.    Review of Systems    Objective   Physical Exam  Constitutional:       Appearance: Normal appearance.   Cardiovascular:      Rate and Rhythm: Normal rate and regular rhythm.   Pulmonary:      Effort: Pulmonary effort is normal.      Breath sounds: Normal breath sounds.   Abdominal:      General: Abdomen is flat. Bowel sounds are normal.      Palpations: Abdomen is soft.   Skin:     General: Skin is warm and dry.   Neurological:      Mental Status: He is alert.   Psychiatric:         Mood and Affect: Mood normal.         Behavior: Behavior normal.         Assessment/Plan   Problem List Items Addressed This Visit       Type 2 diabetes mellitus without complication, without long-term current use of insulin (Multi) - Primary    Relevant Orders    POCT glycosylated hemoglobin (Hb A1C) manually resulted    High cholesterol    Relevant Medications    atorvastatin (Lipitor) 20 mg tablet   Follow up in 4 mos

## 2024-08-14 NOTE — PROGRESS NOTES
Subjective     Patient ID: Huber Owens is a 44 y.o. male who presents for diabetes management.    Diabetes  He presents for his follow-up diabetic visit. He has type 2 diabetes mellitus. There are no hypoglycemic associated symptoms. Associated symptoms include blurred vision and visual change. There are no hypoglycemic complications. Diabetic complications include autonomic neuropathy and retinopathy. Risk factors for coronary artery disease include diabetes mellitus, obesity and male sex. Current diabetic treatment includes oral agent (monotherapy). His home blood glucose trend is decreasing steadily. His overall blood glucose range is 140-180 mg/dl. An ACE inhibitor/angiotensin II receptor blocker is not being taken. He does not see a podiatrist.Eye exam is current.     Allergies   Allergen Reactions   • Rosuvastatin Other     Higher blood sugars     Objective     Current DM Pharmacotherapy:   -metformin 1000mg 1 po bid  Mounjaro 7.5 mg weekly    SECONDARY PREVENTION  - Statin? No  - ACE-I/ARB? No  - Aspirin? No    Current monitoring regimen:   Patient is using: glucometer    SMBG Readings Fastin to 125   Midday 90    Any episodes of hypoglycemia? No  Hypoglycemia awareness? No    Pertinent PMH Review:  - PMH of Pancreatitis: No  - PMH/FH of Medullary Thyroid Cancer: Yes  - PMH of Retinopathy: Yes (following with opthalmology)  - PMH of Urinary Tract Infections: No    Lab Review  Lab Results   Component Value Date    BILITOT 0.7 2024    CALCIUM 9.8 2024    CO2 19 (L) 2024     2024    CREATININE 1.03 2024    GLUCOSE 108 (H) 2024    ALKPHOS 56 2024    K 4.4 2024    PROT 7.6 2024     (L) 2024    AST 26 2024    ALT 47 2024    BUN 17 2024    ANIONGAP 14 2024    ALBUMIN 4.6 2024     Lab Results   Component Value Date    TRIG 160 (H) 2024    CHOL 138 2024    LDLCALC 71 2024    HDL 35.0  08/09/2024     Lab Results   Component Value Date    HGBA1C 6.3 (H) 08/09/2024     The 10-year ASCVD risk score (Taras DK, et al., 2019) is: 6.8%    Values used to calculate the score:      Age: 44 years      Sex: Male      Is Non- : No      Diabetic: Yes      Tobacco smoker: Yes      Systolic Blood Pressure: 123 mmHg      Is BP treated: No      HDL Cholesterol: 35 mg/dL      Total Cholesterol: 138 mg/dL    Assessment/Plan   Patient A1C is in goal at this time. Patient has also progress well with weight loss and is almost to his weight loss goal of 170 lbs. Will continue Mounjaro at 7.5 mg weekly and will follow up in 4 weeks for weight loss evaluation.    Type 2 diabetes mellitus, is not at goal. <7%    CONTINUE Mounjaro 7.5 mg weekly  Follow up: I recommend diabetes care be 4 weeks    Mela Barahona, PharmD    Continue all meds under the continuation of care with the referring provider and clinical pharmacy team

## 2024-08-16 ENCOUNTER — PHARMACY VISIT (OUTPATIENT)
Dept: PHARMACY | Facility: CLINIC | Age: 44
End: 2024-08-16
Payer: MEDICARE

## 2024-08-19 ENCOUNTER — PHARMACY VISIT (OUTPATIENT)
Dept: PHARMACY | Facility: CLINIC | Age: 44
End: 2024-08-19

## 2024-09-11 ENCOUNTER — APPOINTMENT (OUTPATIENT)
Dept: PHARMACY | Facility: HOSPITAL | Age: 44
End: 2024-09-11
Payer: COMMERCIAL

## 2024-09-11 DIAGNOSIS — E11.9 TYPE 2 DIABETES MELLITUS WITHOUT COMPLICATION, WITHOUT LONG-TERM CURRENT USE OF INSULIN (MULTI): Primary | ICD-10-CM

## 2024-09-12 PROCEDURE — RXMED WILLOW AMBULATORY MEDICATION CHARGE

## 2024-09-12 RX ORDER — TIRZEPATIDE 10 MG/.5ML
10 INJECTION, SOLUTION SUBCUTANEOUS WEEKLY
Qty: 2 ML | Refills: 0 | Status: SHIPPED | OUTPATIENT
Start: 2024-09-12

## 2024-09-12 ASSESSMENT — ENCOUNTER SYMPTOMS
BLURRED VISION: 1
VISUAL CHANGE: 1

## 2024-09-12 NOTE — PROGRESS NOTES
Subjective     Patient ID: Huber Owens is a 44 y.o. male who presents for diabetes management.    Diabetes  He presents for his follow-up diabetic visit. He has type 2 diabetes mellitus. There are no hypoglycemic associated symptoms. Associated symptoms include blurred vision and visual change. There are no hypoglycemic complications. Diabetic complications include autonomic neuropathy and retinopathy. Risk factors for coronary artery disease include diabetes mellitus, obesity and male sex. Current diabetic treatment includes oral agent (monotherapy). His home blood glucose trend is decreasing steadily. His overall blood glucose range is 140-180 mg/dl. An ACE inhibitor/angiotensin II receptor blocker is not being taken. He does not see a podiatrist.Eye exam is current.     Allergies   Allergen Reactions    Rosuvastatin Other     Higher blood sugars     Objective     Current DM Pharmacotherapy:   -metformin 1000mg 1 po bid  Mounjaro 7.5 mg weekly    SECONDARY PREVENTION  - Statin? No  - ACE-I/ARB? No  - Aspirin? No    Current monitoring regimen:   Patient is using: glucometer    SMBG Readings Fastin to 101    Any episodes of hypoglycemia? No  Hypoglycemia awareness? No    Pertinent PMH Review:  - PMH of Pancreatitis: No  - PMH/FH of Medullary Thyroid Cancer: Yes  - PMH of Retinopathy: Yes (following with opthalmology)  - PMH of Urinary Tract Infections: No    Lab Review  Lab Results   Component Value Date    BILITOT 0.7 2024    CALCIUM 9.8 2024    CO2 19 (L) 2024     2024    CREATININE 1.03 2024    GLUCOSE 108 (H) 2024    ALKPHOS 56 2024    K 4.4 2024    PROT 7.6 2024     (L) 2024    AST 26 2024    ALT 47 2024    BUN 17 2024    ANIONGAP 14 2024    ALBUMIN 4.6 2024     Lab Results   Component Value Date    TRIG 160 (H) 2024    CHOL 138 2024    LDLCALC 71 2024    HDL 35.0 2024     Lab  Results   Component Value Date    HGBA1C 6.3 (H) 08/09/2024     The 10-year ASCVD risk score (Taras ZUNIGA, et al., 2019) is: 6.8%    Values used to calculate the score:      Age: 44 years      Sex: Male      Is Non- : No      Diabetic: Yes      Tobacco smoker: Yes      Systolic Blood Pressure: 123 mmHg      Is BP treated: No      HDL Cholesterol: 35 mg/dL      Total Cholesterol: 138 mg/dL    Assessment/Plan   Patient's BG are all in goal. Weight is at 174 lbs with his goal being between 165 to 170 lbs. He has stopped losing weight on current dose of Mounjaro. Will increase Mounjaro to 10 mg weekly and will follow up in 4 weeks for weight loss evaluation.    Type 2 diabetes mellitus, is not at goal. <7%    CONTINUE Mounjaro 10 mg weekly  Follow up: I recommend diabetes care be 4 weeks    Mela Barahona, PharmD    Continue all meds under the continuation of care with the referring provider and clinical pharmacy team

## 2024-09-16 ENCOUNTER — PHARMACY VISIT (OUTPATIENT)
Dept: PHARMACY | Facility: CLINIC | Age: 44
End: 2024-09-16
Payer: MEDICARE

## 2024-10-09 ENCOUNTER — APPOINTMENT (OUTPATIENT)
Dept: PHARMACY | Facility: HOSPITAL | Age: 44
End: 2024-10-09
Payer: COMMERCIAL

## 2024-10-09 DIAGNOSIS — E11.9 TYPE 2 DIABETES MELLITUS WITHOUT COMPLICATION, WITHOUT LONG-TERM CURRENT USE OF INSULIN (MULTI): ICD-10-CM

## 2024-10-12 RX ORDER — TIRZEPATIDE 10 MG/.5ML
10 INJECTION, SOLUTION SUBCUTANEOUS WEEKLY
Qty: 2 ML | Refills: 1 | Status: SHIPPED | OUTPATIENT
Start: 2024-10-12

## 2024-10-12 ASSESSMENT — ENCOUNTER SYMPTOMS
BLURRED VISION: 1
VISUAL CHANGE: 1

## 2024-10-12 NOTE — PROGRESS NOTES
Subjective     Patient ID: Huber Owens is a 44 y.o. male who presents for diabetes management.    Diabetes  He presents for his follow-up diabetic visit. He has type 2 diabetes mellitus. There are no hypoglycemic associated symptoms. Associated symptoms include blurred vision and visual change. There are no hypoglycemic complications. Diabetic complications include autonomic neuropathy and retinopathy. Risk factors for coronary artery disease include diabetes mellitus, obesity and male sex. Current diabetic treatment includes oral agent (monotherapy). His home blood glucose trend is decreasing steadily. His overall blood glucose range is 140-180 mg/dl. An ACE inhibitor/angiotensin II receptor blocker is not being taken. He does not see a podiatrist.Eye exam is current.     Allergies   Allergen Reactions    Rosuvastatin Other     Higher blood sugars     Objective     Current DM Pharmacotherapy:   -metformin 1000mg 1 po bid  Mounjaro 10 mg weekly    SECONDARY PREVENTION  - Statin? No  - ACE-I/ARB? No  - Aspirin? No    Current monitoring regimen:   Patient is using: glucometer    SMBG Readings Fastin to 101    Any episodes of hypoglycemia? No  Hypoglycemia awareness? No    Pertinent PMH Review:  - PMH of Pancreatitis: No  - PMH/FH of Medullary Thyroid Cancer: Yes  - PMH of Retinopathy: Yes (following with opthalmology)  - PMH of Urinary Tract Infections: No    Lab Review  Lab Results   Component Value Date    BILITOT 0.7 2024    CALCIUM 9.8 2024    CO2 19 (L) 2024     2024    CREATININE 1.03 2024    GLUCOSE 108 (H) 2024    ALKPHOS 56 2024    K 4.4 2024    PROT 7.6 2024     (L) 2024    AST 26 2024    ALT 47 2024    BUN 17 2024    ANIONGAP 14 2024    ALBUMIN 4.6 2024     Lab Results   Component Value Date    TRIG 160 (H) 2024    CHOL 138 2024    LDLCALC 71 2024    HDL 35.0 2024     Lab  Results   Component Value Date    HGBA1C 6.3 (H) 08/09/2024     The 10-year ASCVD risk score (Taras ZUNIGA, et al., 2019) is: 6.8%    Values used to calculate the score:      Age: 44 years      Sex: Male      Is Non- : No      Diabetic: Yes      Tobacco smoker: Yes      Systolic Blood Pressure: 123 mmHg      Is BP treated: No      HDL Cholesterol: 35 mg/dL      Total Cholesterol: 138 mg/dL    Assessment/Plan   Patient's BG are all in goal. Weight is at 170 lbs with his goal being between 165 to 170 lbs. Will hold at Mounjaro 10 mg weekly since patient is at weight loss goal and will follow up in 4 weeks for weight loss evaluation.    Type 2 diabetes mellitus, is not at goal. <7%    CONTINUE Mounjaro 10 mg weekly  Follow up: I recommend diabetes care be 4 weeks    Mela Barahona, PharmD    Continue all meds under the continuation of care with the referring provider and clinical pharmacy team

## 2024-10-14 PROCEDURE — RXMED WILLOW AMBULATORY MEDICATION CHARGE

## 2024-10-17 ENCOUNTER — PHARMACY VISIT (OUTPATIENT)
Dept: PHARMACY | Facility: CLINIC | Age: 44
End: 2024-10-17
Payer: MEDICARE

## 2024-10-29 ENCOUNTER — APPOINTMENT (OUTPATIENT)
Dept: PRIMARY CARE | Facility: CLINIC | Age: 44
End: 2024-10-29
Payer: COMMERCIAL

## 2024-10-29 VITALS
HEART RATE: 86 BPM | DIASTOLIC BLOOD PRESSURE: 82 MMHG | BODY MASS INDEX: 26.03 KG/M2 | RESPIRATION RATE: 16 BRPM | OXYGEN SATURATION: 98 % | TEMPERATURE: 97.8 F | WEIGHT: 166.2 LBS | SYSTOLIC BLOOD PRESSURE: 124 MMHG

## 2024-10-29 DIAGNOSIS — J30.9 ALLERGIC RHINITIS, UNSPECIFIED SEASONALITY, UNSPECIFIED TRIGGER: Primary | ICD-10-CM

## 2024-10-29 DIAGNOSIS — E11.9 TYPE 2 DIABETES MELLITUS WITHOUT COMPLICATION, WITHOUT LONG-TERM CURRENT USE OF INSULIN (MULTI): ICD-10-CM

## 2024-10-29 PROCEDURE — 99213 OFFICE O/P EST LOW 20 MIN: CPT | Performed by: FAMILY MEDICINE

## 2024-10-29 PROCEDURE — 3048F LDL-C <100 MG/DL: CPT | Performed by: FAMILY MEDICINE

## 2024-10-29 PROCEDURE — 3044F HG A1C LEVEL LT 7.0%: CPT | Performed by: FAMILY MEDICINE

## 2024-10-29 PROCEDURE — 3074F SYST BP LT 130 MM HG: CPT | Performed by: FAMILY MEDICINE

## 2024-10-29 PROCEDURE — 3079F DIAST BP 80-89 MM HG: CPT | Performed by: FAMILY MEDICINE

## 2024-10-29 PROCEDURE — 4004F PT TOBACCO SCREEN RCVD TLK: CPT | Performed by: FAMILY MEDICINE

## 2024-10-29 PROCEDURE — 3062F POS MACROALBUMINURIA REV: CPT | Performed by: FAMILY MEDICINE

## 2024-10-29 RX ORDER — CETIRIZINE HYDROCHLORIDE 10 MG/1
10 TABLET ORAL DAILY
COMMUNITY
Start: 2024-10-29 | End: 2025-01-27

## 2024-10-29 RX ORDER — PREDNISONE 20 MG/1
40 TABLET ORAL DAILY
Qty: 10 TABLET | Refills: 0 | Status: SHIPPED | OUTPATIENT
Start: 2024-10-29 | End: 2024-11-03

## 2024-10-29 RX ORDER — FLUTICASONE PROPIONATE 50 MCG
1 SPRAY, SUSPENSION (ML) NASAL DAILY
COMMUNITY
Start: 2024-10-29 | End: 2025-10-29

## 2024-10-30 RX ORDER — BLOOD SUGAR DIAGNOSTIC
STRIP MISCELLANEOUS
Qty: 100 STRIP | Refills: 2 | Status: SHIPPED | OUTPATIENT
Start: 2024-10-30

## 2024-11-06 ENCOUNTER — APPOINTMENT (OUTPATIENT)
Dept: PHARMACY | Facility: HOSPITAL | Age: 44
End: 2024-11-06
Payer: COMMERCIAL

## 2024-11-06 DIAGNOSIS — E11.9 TYPE 2 DIABETES MELLITUS WITHOUT COMPLICATION, WITHOUT LONG-TERM CURRENT USE OF INSULIN (MULTI): ICD-10-CM

## 2024-11-07 PROCEDURE — RXMED WILLOW AMBULATORY MEDICATION CHARGE

## 2024-11-07 RX ORDER — TIRZEPATIDE 10 MG/.5ML
10 INJECTION, SOLUTION SUBCUTANEOUS WEEKLY
Qty: 6 ML | Refills: 1 | Status: SHIPPED | OUTPATIENT
Start: 2024-11-07

## 2024-11-07 ASSESSMENT — ENCOUNTER SYMPTOMS
VISUAL CHANGE: 1
BLURRED VISION: 1

## 2024-11-07 NOTE — PROGRESS NOTES
Subjective     Patient ID: Huber Owens is a 44 y.o. male who presents for diabetes management.    Diabetes  He presents for his follow-up diabetic visit. He has type 2 diabetes mellitus. There are no hypoglycemic associated symptoms. Associated symptoms include blurred vision and visual change. There are no hypoglycemic complications. Diabetic complications include autonomic neuropathy and retinopathy. Risk factors for coronary artery disease include diabetes mellitus, obesity and male sex. Current diabetic treatment includes oral agent (monotherapy). His home blood glucose trend is decreasing steadily. His overall blood glucose range is 140-180 mg/dl. An ACE inhibitor/angiotensin II receptor blocker is not being taken. He does not see a podiatrist.Eye exam is current.     Allergies   Allergen Reactions    Rosuvastatin Other     Higher blood sugars     Objective     Current DM Pharmacotherapy:   -metformin 1000mg 1 po bid  Mounjaro 10 mg weekly    SECONDARY PREVENTION  - Statin? No  - ACE-I/ARB? No  - Aspirin? No    Current monitoring regimen:   Patient is using: glucometer    SMBG Readings Fastin to 101    Any episodes of hypoglycemia? No  Hypoglycemia awareness? No    Pertinent PMH Review:  - PMH of Pancreatitis: No  - PMH/FH of Medullary Thyroid Cancer: Yes  - PMH of Retinopathy: Yes (following with opthalmology)  - PMH of Urinary Tract Infections: No    Lab Review  Lab Results   Component Value Date    BILITOT 0.7 2024    CALCIUM 9.8 2024    CO2 19 (L) 2024     2024    CREATININE 1.03 2024    GLUCOSE 108 (H) 2024    ALKPHOS 56 2024    K 4.4 2024    PROT 7.6 2024     (L) 2024    AST 26 2024    ALT 47 2024    BUN 17 2024    ANIONGAP 14 2024    ALBUMIN 4.6 2024     Lab Results   Component Value Date    TRIG 160 (H) 2024    CHOL 138 2024    LDLCALC 71 2024    HDL 35.0 2024     Lab  Results   Component Value Date    HGBA1C 6.3 (H) 08/09/2024     The 10-year ASCVD risk score (Taras ZUNIGA, et al., 2019) is: 6.9%    Values used to calculate the score:      Age: 44 years      Sex: Male      Is Non- : No      Diabetic: Yes      Tobacco smoker: Yes      Systolic Blood Pressure: 124 mmHg      Is BP treated: No      HDL Cholesterol: 35 mg/dL      Total Cholesterol: 138 mg/dL    Assessment/Plan   Patient's BG are all in goal. Weight is at 166 lbs with his goal being between 165 to 170 lbs. Will hold at Mounjaro 10 mg weekly since patient is at weight loss goal and will follow up in 5 weeks for weight loss evaluation.    Type 2 diabetes mellitus, is not at goal. <7%    CONTINUE Mounjaro 10 mg weekly  Follow up: I recommend diabetes care be 5 weeks    Mela Barahona, PharmD    Continue all meds under the continuation of care with the referring provider and clinical pharmacy team

## 2024-11-12 ENCOUNTER — PHARMACY VISIT (OUTPATIENT)
Dept: PHARMACY | Facility: CLINIC | Age: 44
End: 2024-11-12
Payer: MEDICARE

## 2024-11-21 ENCOUNTER — PATIENT MESSAGE (OUTPATIENT)
Dept: PRIMARY CARE | Facility: CLINIC | Age: 44
End: 2024-11-21
Payer: COMMERCIAL

## 2024-11-21 DIAGNOSIS — R09.81 NASAL CONGESTION: Primary | ICD-10-CM

## 2024-12-10 PROCEDURE — RXMED WILLOW AMBULATORY MEDICATION CHARGE

## 2024-12-11 ENCOUNTER — APPOINTMENT (OUTPATIENT)
Dept: OTOLARYNGOLOGY | Facility: CLINIC | Age: 44
End: 2024-12-11
Payer: COMMERCIAL

## 2024-12-11 VITALS
SYSTOLIC BLOOD PRESSURE: 118 MMHG | HEIGHT: 67 IN | BODY MASS INDEX: 26.46 KG/M2 | DIASTOLIC BLOOD PRESSURE: 84 MMHG | TEMPERATURE: 97.8 F | WEIGHT: 168.6 LBS

## 2024-12-11 DIAGNOSIS — J30.9 CHRONIC ALLERGIC RHINITIS: Primary | ICD-10-CM

## 2024-12-11 DIAGNOSIS — R09.81 NASAL CONGESTION: ICD-10-CM

## 2024-12-11 DIAGNOSIS — H69.92 DYSFUNCTION OF LEFT EUSTACHIAN TUBE: ICD-10-CM

## 2024-12-11 DIAGNOSIS — J34.3 HYPERTROPHY OF BOTH INFERIOR NASAL TURBINATES: ICD-10-CM

## 2024-12-11 PROCEDURE — 3079F DIAST BP 80-89 MM HG: CPT | Performed by: OTOLARYNGOLOGY

## 2024-12-11 PROCEDURE — 4004F PT TOBACCO SCREEN RCVD TLK: CPT | Performed by: OTOLARYNGOLOGY

## 2024-12-11 PROCEDURE — 99203 OFFICE O/P NEW LOW 30 MIN: CPT | Performed by: OTOLARYNGOLOGY

## 2024-12-11 PROCEDURE — 3062F POS MACROALBUMINURIA REV: CPT | Performed by: OTOLARYNGOLOGY

## 2024-12-11 PROCEDURE — 3044F HG A1C LEVEL LT 7.0%: CPT | Performed by: OTOLARYNGOLOGY

## 2024-12-11 PROCEDURE — 3048F LDL-C <100 MG/DL: CPT | Performed by: OTOLARYNGOLOGY

## 2024-12-11 PROCEDURE — 3008F BODY MASS INDEX DOCD: CPT | Performed by: OTOLARYNGOLOGY

## 2024-12-11 PROCEDURE — 3074F SYST BP LT 130 MM HG: CPT | Performed by: OTOLARYNGOLOGY

## 2024-12-11 ASSESSMENT — ENCOUNTER SYMPTOMS
ABDOMINAL PAIN: 0
HEADACHES: 0
DIARRHEA: 0
SORE THROAT: 0
COUGH: 0
VOMITING: 0
NECK PAIN: 0
RHINORRHEA: 0

## 2024-12-11 NOTE — PROGRESS NOTES
Impression:  1. Chronic allergic rhinitis        2. Nasal congestion  Referral to ENT      3. Hypertrophy of both inferior nasal turbinates        4. Dysfunction of left eustachian tube         5.  Tobacco abuse    RECOMMENDATIONS/PLAN :  I reassured the patient there is no evidence of any infectious drainage however his nose appears somewhat allergic.  We will restart Flonase nasal spray-2 puffs each nostril daily for the next 2 months.  I want him to start flushing his nose using a sinus rinse kit as well.  We had a lengthy discussion regarding the fact that he absolutely must quit smoking sooner than later.      **This electronic medical record note was created with the use of voice recognition software.  Despite proofreading, typographical or grammatical errors may be present that could affect meaning of content **    Subjective   Patient ID:     Huber Owens is a 44 y.o. male who presents to the office today complaining of nasal congestion and some pressure in his left ear.  He did use Flonase for about 2 weeks.  The patient does smoke and we had a lengthy discussion regarding the fact that he must quit sooner than later.  He denies any infectious drainage fever or chills.  No middle ear infections.    ROS:  A detailed 12 system review of systems is noted on the intake form has been reviewed with the patient with details noted in the HPI and scanned into the patient's medical record.    Objective     Past Medical History:   Diagnosis Date    Anxiety 10/22/2023    Personal history of other diseases of the digestive system 11/28/2016    History of bilateral inguinal hernias    Pityriasis versicolor 02/02/2016    Tinea versicolor        Past Surgical History:   Procedure Laterality Date    HIP SURGERY  02/02/2016    Hip Surgery    MOUTH SURGERY  02/02/2016    Oral Surgery Tooth Extraction        Allergies   Allergen Reactions    Rosuvastatin Other     Higher blood sugars          Current Outpatient Medications:  "    atorvastatin (Lipitor) 20 mg tablet, Take 1 tablet (20 mg) by mouth once daily., Disp: 90 tablet, Rfl: 3    tirzepatide (Mounjaro) 10 mg/0.5 mL pen injector, Inject 10 mg under the skin 1 (one) time per week., Disp: 6 mL, Rfl: 1    Accu-Chek Guide Glucose Meter misc, USE AS DIRECTED, Disp: , Rfl:     Accu-Chek Guide test strips strip, CHECK BLOOD SUGAR DAILY BEFORE BREAKFAST AND AS NEEDED., Disp: 100 strip, Rfl: 2    cetirizine (ZyrTEC) 10 mg tablet, Take 1 tablet (10 mg) by mouth once daily., Disp: , Rfl:     fluticasone (Flonase) 50 mcg/actuation nasal spray, Administer 1 spray into each nostril once daily. Shake gently. Before first use, prime pump. After use, clean tip and replace cap., Disp: , Rfl:     lancets misc, Check blood sugar daily before breakfast and as needed., Disp: 100 each, Rfl: 1     Tobacco Use: High Risk (12/11/2024)    Patient History     Smoking Tobacco Use: Every Day     Smokeless Tobacco Use: Never     Passive Exposure: Not on file        Alcohol Use: Not on file        Social History     Substance and Sexual Activity   Drug Use Never        Physical Exam:  Visit Vitals  /84   Temp 36.6 °C (97.8 °F) (Temporal)   Ht 1.702 m (5' 7\")   Wt 76.5 kg (168 lb 9.6 oz)   BMI 26.41 kg/m²   Smoking Status Every Day   BSA 1.9 m²      General: Patient is alert, oriented, cooperative in no apparent distress.  Head: Normocephalic, atraumatic.  Eyes: PERRL, EOMI, Conjunctiva is clear. No nystagmus.  Ears: Right Ear-- Pinna is normal.  External auditory canal is patent. Tympanic membrane is [intact, translucent and has good mobility with my pneumatic otoscope. No effusion].  Mastoid is nontender.  Left ear-- Pinna is normal.  External auditory canal is patent. Tympanic membrane is [intact, translucent and has good mobility with my pneumatic otoscope.  No effusion].  Mastoid is nontender.  Nose: Septum is straight.  No septal perforation or lesions. No septal hematoma/ seroma.  No signs of bleeding. "  Inferior turbinates are moderately swollen and pale.   No evidence of intranasal polyps.  No infectious drainage.  Throat:  Floor of mouth is clear, no masses.  Tongue appears normal, no lesions or masses. Gums, gingiva, buccal mucosa appear pink and moist, no lesions. Teeth are in good repair.  No obvious dental infections.  Peritonsillar regions appear symmetric without swelling.  Hard and soft palate appear normal, no obvious cleft. Uvula is midline.  Oropharynx: No lesions. Retropharyngeal wall is flat.  No active postnasal drip.  Neck: Supple,  no lymphadenopathy.  No masses.  Salivary Glands: Symmetric bilaterally.  No palpable masses.  No evidence of acute infection or salivary stones  Neurologic: Cranial Nerves 2-12 are grossly intact without focal deficits. Cerebellar function testing is normal.     Results:   []    Procedure:   []    Berhane Seymour, DO

## 2024-12-16 ENCOUNTER — PHARMACY VISIT (OUTPATIENT)
Dept: PHARMACY | Facility: CLINIC | Age: 44
End: 2024-12-16
Payer: MEDICARE

## 2024-12-18 ENCOUNTER — APPOINTMENT (OUTPATIENT)
Dept: PRIMARY CARE | Facility: CLINIC | Age: 44
End: 2024-12-18
Payer: COMMERCIAL

## 2024-12-18 VITALS
WEIGHT: 167.4 LBS | DIASTOLIC BLOOD PRESSURE: 82 MMHG | HEART RATE: 91 BPM | OXYGEN SATURATION: 98 % | SYSTOLIC BLOOD PRESSURE: 117 MMHG | RESPIRATION RATE: 16 BRPM | BODY MASS INDEX: 26.22 KG/M2 | TEMPERATURE: 97.7 F

## 2024-12-18 DIAGNOSIS — E11.9 TYPE 2 DIABETES MELLITUS WITHOUT COMPLICATION, WITHOUT LONG-TERM CURRENT USE OF INSULIN (MULTI): ICD-10-CM

## 2024-12-18 DIAGNOSIS — E78.00 HIGH CHOLESTEROL: Primary | ICD-10-CM

## 2024-12-18 DIAGNOSIS — R53.83 OTHER FATIGUE: ICD-10-CM

## 2024-12-18 LAB — POC HEMOGLOBIN A1C: 5.4 % (ref 4.2–6.5)

## 2024-12-18 PROCEDURE — 4004F PT TOBACCO SCREEN RCVD TLK: CPT | Performed by: FAMILY MEDICINE

## 2024-12-18 PROCEDURE — 3079F DIAST BP 80-89 MM HG: CPT | Performed by: FAMILY MEDICINE

## 2024-12-18 PROCEDURE — 3048F LDL-C <100 MG/DL: CPT | Performed by: FAMILY MEDICINE

## 2024-12-18 PROCEDURE — 83036 HEMOGLOBIN GLYCOSYLATED A1C: CPT | Performed by: FAMILY MEDICINE

## 2024-12-18 PROCEDURE — 99214 OFFICE O/P EST MOD 30 MIN: CPT | Performed by: FAMILY MEDICINE

## 2024-12-18 PROCEDURE — 3044F HG A1C LEVEL LT 7.0%: CPT | Performed by: FAMILY MEDICINE

## 2024-12-18 PROCEDURE — 3074F SYST BP LT 130 MM HG: CPT | Performed by: FAMILY MEDICINE

## 2024-12-18 PROCEDURE — 3062F POS MACROALBUMINURIA REV: CPT | Performed by: FAMILY MEDICINE

## 2024-12-18 NOTE — PROGRESS NOTES
Subjective   Patient ID: Huber Owens is a 44 y.o. male who presents for Diabetes.  HPI    Patient states that she is tolerating diabetes medications well with good adherence.  They deny any symptoms of hyper or hypoglycemia.    Still having some nausea and constipation.  Eating smaller portion.       Patient states they are  tolerating cholesterol medications well with good adherence.  They deny any chest pain shortness of breath, muscle pain or weakness.        Review of Systems    Objective   Physical Exam  Constitutional:       Appearance: Normal appearance.   Cardiovascular:      Rate and Rhythm: Normal rate and regular rhythm.   Pulmonary:      Effort: Pulmonary effort is normal.      Breath sounds: Normal breath sounds.   Abdominal:      General: Abdomen is flat. Bowel sounds are normal.      Palpations: Abdomen is soft.   Skin:     General: Skin is warm and dry.   Neurological:      Mental Status: He is alert.   Psychiatric:         Mood and Affect: Mood normal.         Behavior: Behavior normal.         Assessment & Plan  Type 2 diabetes mellitus without complication, without long-term current use of insulin (Multi)  C/w tirzepatide  Orders:    POCT glycosylated hemoglobin (Hb A1C) manually resulted    High cholesterol  C/w atorvastatin          Follow up in 4 mos

## 2024-12-19 ENCOUNTER — APPOINTMENT (OUTPATIENT)
Dept: PHARMACY | Facility: HOSPITAL | Age: 44
End: 2024-12-19
Payer: COMMERCIAL

## 2024-12-19 DIAGNOSIS — E11.9 TYPE 2 DIABETES MELLITUS WITHOUT COMPLICATION, WITHOUT LONG-TERM CURRENT USE OF INSULIN (MULTI): ICD-10-CM

## 2024-12-20 RX ORDER — TIRZEPATIDE 10 MG/.5ML
10 INJECTION, SOLUTION SUBCUTANEOUS WEEKLY
Qty: 6 ML | Refills: 1 | Status: SHIPPED | OUTPATIENT
Start: 2024-12-20

## 2024-12-20 ASSESSMENT — ENCOUNTER SYMPTOMS
VISUAL CHANGE: 1
BLURRED VISION: 1

## 2024-12-20 NOTE — PROGRESS NOTES
Subjective     Patient ID: Huber Owens is a 44 y.o. male who presents for diabetes management.    Diabetes  He presents for his follow-up diabetic visit. He has type 2 diabetes mellitus. There are no hypoglycemic associated symptoms. Associated symptoms include blurred vision and visual change. There are no hypoglycemic complications. Diabetic complications include autonomic neuropathy and retinopathy. Risk factors for coronary artery disease include diabetes mellitus, obesity and male sex. Current diabetic treatment includes oral agent (monotherapy). His home blood glucose trend is decreasing steadily. His overall blood glucose range is 140-180 mg/dl. An ACE inhibitor/angiotensin II receptor blocker is not being taken. He does not see a podiatrist.Eye exam is current.     Allergies   Allergen Reactions    Rosuvastatin Other     Higher blood sugars     Objective     Current DM Pharmacotherapy:   -Mounjaro 10 mg weekly    SECONDARY PREVENTION  - Statin? No  - ACE-I/ARB? No  - Aspirin? No    Current monitoring regimen:   Patient is using: glucometer    SMBG Readings Fastin to 101    Any episodes of hypoglycemia? No  Hypoglycemia awareness? No    Pertinent PMH Review:  - PMH of Pancreatitis: No  - PMH/FH of Medullary Thyroid Cancer: Yes  - PMH of Retinopathy: Yes (following with opthalmology)  - PMH of Urinary Tract Infections: No    Lab Review  Lab Results   Component Value Date    BILITOT 0.7 2024    CALCIUM 9.8 2024    CO2 19 (L) 2024     2024    CREATININE 1.03 2024    GLUCOSE 108 (H) 2024    ALKPHOS 56 2024    K 4.4 2024    PROT 7.6 2024     (L) 2024    AST 26 2024    ALT 47 2024    BUN 17 2024    ANIONGAP 14 2024    ALBUMIN 4.6 2024     Lab Results   Component Value Date    TRIG 160 (H) 2024    CHOL 138 2024    LDLCALC 71 2024    HDL 35.0 2024     Lab Results   Component Value  Date    HGBA1C 5.4 12/18/2024     The 10-year ASCVD risk score (Taras ZUNIGA, et al., 2019) is: 6.3%    Values used to calculate the score:      Age: 44 years      Sex: Male      Is Non- : No      Diabetic: Yes      Tobacco smoker: Yes      Systolic Blood Pressure: 117 mmHg      Is BP treated: No      HDL Cholesterol: 35 mg/dL      Total Cholesterol: 138 mg/dL    Assessment/Plan   Patient's BG and A1C are all in goal. Weight is still in goal at 167 lbs. Will continue all medications as prescribed and will follow up n 3 months for periodic A1c check.    Type 2 diabetes mellitus, is not at goal. <7%    CONTINUE Mounjaro 10 mg weekly  Follow up: I recommend diabetes care be 12 weeks    Mela Barahona, PharmD    Continue all meds under the continuation of care with the referring provider and clinical pharmacy team

## 2025-01-06 PROCEDURE — RXMED WILLOW AMBULATORY MEDICATION CHARGE

## 2025-01-08 ENCOUNTER — PHARMACY VISIT (OUTPATIENT)
Dept: PHARMACY | Facility: CLINIC | Age: 45
End: 2025-01-08
Payer: MEDICARE

## 2025-02-03 PROCEDURE — RXMED WILLOW AMBULATORY MEDICATION CHARGE

## 2025-02-05 ENCOUNTER — PHARMACY VISIT (OUTPATIENT)
Dept: PHARMACY | Facility: CLINIC | Age: 45
End: 2025-02-05
Payer: MEDICARE

## 2025-03-03 PROCEDURE — RXMED WILLOW AMBULATORY MEDICATION CHARGE

## 2025-03-06 ENCOUNTER — PHARMACY VISIT (OUTPATIENT)
Dept: PHARMACY | Facility: CLINIC | Age: 45
End: 2025-03-06
Payer: MEDICARE

## 2025-03-27 PROCEDURE — RXMED WILLOW AMBULATORY MEDICATION CHARGE

## 2025-03-31 ENCOUNTER — PHARMACY VISIT (OUTPATIENT)
Dept: PHARMACY | Facility: CLINIC | Age: 45
End: 2025-03-31
Payer: MEDICARE

## 2025-04-22 ENCOUNTER — APPOINTMENT (OUTPATIENT)
Dept: PRIMARY CARE | Facility: CLINIC | Age: 45
End: 2025-04-22
Payer: COMMERCIAL

## 2025-04-22 VITALS
TEMPERATURE: 98 F | BODY MASS INDEX: 24.39 KG/M2 | HEART RATE: 88 BPM | DIASTOLIC BLOOD PRESSURE: 74 MMHG | OXYGEN SATURATION: 98 % | RESPIRATION RATE: 16 BRPM | HEIGHT: 67 IN | WEIGHT: 155.4 LBS | SYSTOLIC BLOOD PRESSURE: 110 MMHG

## 2025-04-22 DIAGNOSIS — R53.83 OTHER FATIGUE: ICD-10-CM

## 2025-04-22 DIAGNOSIS — H69.92 EUSTACHIAN TUBE DYSFUNCTION, LEFT: ICD-10-CM

## 2025-04-22 DIAGNOSIS — E11.9 TYPE 2 DIABETES MELLITUS WITHOUT COMPLICATION, WITHOUT LONG-TERM CURRENT USE OF INSULIN: ICD-10-CM

## 2025-04-22 DIAGNOSIS — E78.00 HIGH CHOLESTEROL: ICD-10-CM

## 2025-04-22 DIAGNOSIS — Z00.00 HEALTHCARE MAINTENANCE: Primary | ICD-10-CM

## 2025-04-22 LAB — POC HEMOGLOBIN A1C: 5.1 % (ref 4.2–6.5)

## 2025-04-22 PROCEDURE — 3074F SYST BP LT 130 MM HG: CPT | Performed by: FAMILY MEDICINE

## 2025-04-22 PROCEDURE — 83036 HEMOGLOBIN GLYCOSYLATED A1C: CPT | Performed by: FAMILY MEDICINE

## 2025-04-22 PROCEDURE — 3008F BODY MASS INDEX DOCD: CPT | Performed by: FAMILY MEDICINE

## 2025-04-22 PROCEDURE — 3078F DIAST BP <80 MM HG: CPT | Performed by: FAMILY MEDICINE

## 2025-04-22 PROCEDURE — 3044F HG A1C LEVEL LT 7.0%: CPT | Performed by: FAMILY MEDICINE

## 2025-04-22 PROCEDURE — 99396 PREV VISIT EST AGE 40-64: CPT | Performed by: FAMILY MEDICINE

## 2025-04-22 ASSESSMENT — ENCOUNTER SYMPTOMS
EYE DISCHARGE: 0
CONSTIPATION: 0
CHILLS: 0
FEVER: 0
POLYDIPSIA: 0
ABDOMINAL PAIN: 0
HALLUCINATIONS: 0
DIARRHEA: 0
WEAKNESS: 0
SHORTNESS OF BREATH: 0
PALPITATIONS: 0
SEIZURES: 0
CONFUSION: 0
DIFFICULTY URINATING: 0
AGITATION: 0
BRUISES/BLEEDS EASILY: 0
NAUSEA: 0
COUGH: 0
SORE THROAT: 0
VOMITING: 0
JOINT SWELLING: 0
MYALGIAS: 0
DIZZINESS: 0
ABDOMINAL DISTENTION: 0
ADENOPATHY: 0
HEMATURIA: 0
SINUS PRESSURE: 0

## 2025-04-22 ASSESSMENT — PATIENT HEALTH QUESTIONNAIRE - PHQ9
2. FEELING DOWN, DEPRESSED OR HOPELESS: NOT AT ALL
1. LITTLE INTEREST OR PLEASURE IN DOING THINGS: NOT AT ALL
SUM OF ALL RESPONSES TO PHQ9 QUESTIONS 1 AND 2: 0

## 2025-04-22 NOTE — ASSESSMENT & PLAN NOTE
Orders:    POCT glycosylated hemoglobin (Hb A1C) manually resulted    Hemoglobin A1C; Future    Comprehensive Metabolic Panel; Future    Lipid Panel; Future    Albumin-Creatinine Ratio, Urine Random; Future

## 2025-04-22 NOTE — ASSESSMENT & PLAN NOTE
Orders:    Hemoglobin A1C; Future    Comprehensive Metabolic Panel; Future    Lipid Panel; Future    Albumin-Creatinine Ratio, Urine Random; Future

## 2025-04-22 NOTE — PATIENT INSTRUCTIONS
Beginning January 27, 2025 [employer] will be offering outpatient laboratory services at most [facility] locations.  They do prefer that you make an appointment for your lab draws although walk-ins are available.  Patients with appointment times will take priority over walk-ins.    You can schedule an appointment one of the following methods:    -  [facility]    -  Calling [phone]    -  [email or web address]    Please take your ID and insurance card to each visit

## 2025-04-22 NOTE — ASSESSMENT & PLAN NOTE
Encouraged patient to stop smoking, resume using Flonase if symptoms are not resolving in 2 to 3 weeks follow-up with ENT

## 2025-04-22 NOTE — PROGRESS NOTES
Subjective   Patient ID: Huber Owens is a 44 y.o. male who presents for Annual Exam and Diabetes.  Well Adult Physical   Patient here for a comprehensive physical exam.The patient reports no problems    Diet: Well balanced, high in protein    Exercise: walking 5 days per week     Social History    Tobacco Use      Smoking status: Every Day        Packs/day: 1.00        Types: Cigarettes      Smokeless tobacco: Never    Alcohol use: Yes    Drug use: Never        Immunization History  Administered            Date(s) Administered    Tdap vaccine, age 7 year and older (BOOSTRIX, ADACEL)                          02/06/2024               Review of Systems   Constitutional:  Negative for chills and fever.   HENT:  Positive for hearing loss (Intermittent pressure and decreased hearing in left ear has been going on for quite a long time in the past patient has taken Flonase with some relief but not currently using). Negative for ear pain, sinus pressure and sore throat.    Eyes:  Negative for discharge and visual disturbance.   Respiratory:  Negative for cough and shortness of breath.    Cardiovascular:  Negative for chest pain and palpitations.   Gastrointestinal:  Negative for abdominal distention, abdominal pain, constipation, diarrhea, nausea and vomiting.   Endocrine: Negative for cold intolerance, heat intolerance, polydipsia and polyuria.   Genitourinary:  Negative for difficulty urinating, hematuria and urgency.   Musculoskeletal:  Negative for joint swelling and myalgias.   Skin:  Negative for rash.   Allergic/Immunologic: Negative for environmental allergies and immunocompromised state.   Neurological:  Negative for dizziness, seizures and weakness.   Hematological:  Negative for adenopathy. Does not bruise/bleed easily.   Psychiatric/Behavioral:  Negative for agitation, confusion and hallucinations.        Objective   Physical Exam  Constitutional:       Appearance: Normal appearance.   HENT:      Head:  Normocephalic and atraumatic.      Right Ear: Tympanic membrane normal.      Ears:      Comments: Left TM AF level     Nose: Nose normal.      Mouth/Throat:      Mouth: Mucous membranes are moist.      Pharynx: Oropharynx is clear.   Eyes:      Conjunctiva/sclera: Conjunctivae normal.      Pupils: Pupils are equal, round, and reactive to light.   Cardiovascular:      Rate and Rhythm: Normal rate and regular rhythm.   Pulmonary:      Effort: Pulmonary effort is normal.      Breath sounds: Normal breath sounds.   Abdominal:      General: Abdomen is flat. Bowel sounds are normal.      Palpations: Abdomen is soft.   Skin:     General: Skin is warm and dry.   Neurological:      Mental Status: He is alert.   Psychiatric:         Mood and Affect: Mood normal.         Behavior: Behavior normal.         Assessment & Plan  Type 2 diabetes mellitus without complication, without long-term current use of insulin    Orders:    POCT glycosylated hemoglobin (Hb A1C) manually resulted    Hemoglobin A1C; Future    Comprehensive Metabolic Panel; Future    Lipid Panel; Future    Albumin-Creatinine Ratio, Urine Random; Future    Healthcare maintenance    Orders:    Hemoglobin A1C; Future    Comprehensive Metabolic Panel; Future    Lipid Panel; Future    Albumin-Creatinine Ratio, Urine Random; Future    High cholesterol    Orders:    Hemoglobin A1C; Future    Comprehensive Metabolic Panel; Future    Lipid Panel; Future    Albumin-Creatinine Ratio, Urine Random; Future    Other fatigue    Orders:    Testosterone, total and free; Future    Eustachian tube dysfunction, left  Encouraged patient to stop smoking, resume using Flonase if symptoms are not resolving in 2 to 3 weeks follow-up with ENT          Follow up in 6 mos

## 2025-04-23 ENCOUNTER — APPOINTMENT (OUTPATIENT)
Dept: PHARMACY | Facility: HOSPITAL | Age: 45
End: 2025-04-23
Payer: COMMERCIAL

## 2025-04-23 DIAGNOSIS — E11.9 TYPE 2 DIABETES MELLITUS WITHOUT COMPLICATION, WITHOUT LONG-TERM CURRENT USE OF INSULIN: ICD-10-CM

## 2025-04-25 PROCEDURE — RXMED WILLOW AMBULATORY MEDICATION CHARGE

## 2025-04-25 RX ORDER — TIRZEPATIDE 10 MG/.5ML
10 INJECTION, SOLUTION SUBCUTANEOUS WEEKLY
Qty: 6 ML | Refills: 1 | Status: SHIPPED | OUTPATIENT
Start: 2025-04-25

## 2025-04-25 ASSESSMENT — ENCOUNTER SYMPTOMS
VISUAL CHANGE: 1
BLURRED VISION: 1

## 2025-04-25 NOTE — PROGRESS NOTES
Subjective     Patient ID: Huber Owens is a 44 y.o. male who presents for diabetes management.    Diabetes  He presents for his follow-up diabetic visit. He has type 2 diabetes mellitus. There are no hypoglycemic associated symptoms. Associated symptoms include blurred vision and visual change. There are no hypoglycemic complications. Diabetic complications include autonomic neuropathy and retinopathy. Risk factors for coronary artery disease include diabetes mellitus, obesity and male sex. Current diabetic treatment includes oral agent (monotherapy). His home blood glucose trend is decreasing steadily. His overall blood glucose range is 140-180 mg/dl. An ACE inhibitor/angiotensin II receptor blocker is not being taken. He does not see a podiatrist.Eye exam is current.     Allergies   Allergen Reactions    Rosuvastatin Other     Higher blood sugars     Objective     Current DM Pharmacotherapy:   -Mounjaro 10 mg weekly    SECONDARY PREVENTION  - Statin? No  - ACE-I/ARB? No  - Aspirin? No    Current monitoring regimen:   Patient is using: glucometer    SMBG Readings Fastin to 101    Any episodes of hypoglycemia? No  Hypoglycemia awareness? No    Pertinent PMH Review:  - PMH of Pancreatitis: No  - PMH/FH of Medullary Thyroid Cancer: Yes  - PMH of Retinopathy: Yes (following with opthalmology)  - PMH of Urinary Tract Infections: No    Lab Review  Lab Results   Component Value Date    BILITOT 0.7 2024    CALCIUM 9.8 2024    CO2 19 (L) 2024     2024    CREATININE 1.03 2024    GLUCOSE 108 (H) 2024    ALKPHOS 56 2024    K 4.4 2024    PROT 7.6 2024     (L) 2024    AST 26 2024    ALT 47 2024    BUN 17 2024    ANIONGAP 14 2024    ALBUMIN 4.6 2024     Lab Results   Component Value Date    TRIG 160 (H) 2024    CHOL 138 2024    LDLCALC 71 2024    HDL 35.0 2024     Lab Results   Component Value  Date    HGBA1C 5.1 04/22/2025     The 10-year ASCVD risk score (Taras ZUNIGA, et al., 2019) is: 5.6%    Values used to calculate the score:      Age: 44 years      Sex: Male      Is Non- : No      Diabetic: Yes      Tobacco smoker: Yes      Systolic Blood Pressure: 110 mmHg      Is BP treated: No      HDL Cholesterol: 35 mg/dL      Total Cholesterol: 138 mg/dL    Assessment/Plan   Patient's BG and A1C are all in goal. Weight is now down to 155 lbs! Will continue all medications and will follow up in 6 months for A1C check in.    Type 2 diabetes mellitus, is not at goal. <7%    CONTINUE Mounjaro 10 mg weekly  Follow up: I recommend diabetes care be 24 weeks    Mela Barahona, PharmD    Continue all meds under the continuation of care with the referring provider and clinical pharmacy team

## 2025-04-28 ENCOUNTER — PHARMACY VISIT (OUTPATIENT)
Dept: PHARMACY | Facility: CLINIC | Age: 45
End: 2025-04-28
Payer: MEDICARE

## 2025-05-13 ENCOUNTER — PATIENT MESSAGE (OUTPATIENT)
Dept: PRIMARY CARE | Facility: CLINIC | Age: 45
End: 2025-05-13
Payer: COMMERCIAL

## 2025-05-13 DIAGNOSIS — R79.89 LOW TESTOSTERONE IN MALE: Primary | ICD-10-CM

## 2025-05-13 LAB
TESTOST FREE SERPL-MCNC: 38.6 PG/ML (ref 35–155)
TESTOST SERPL-MCNC: 330 NG/DL (ref 250–1100)

## 2025-05-22 PROCEDURE — RXMED WILLOW AMBULATORY MEDICATION CHARGE

## 2025-05-23 ENCOUNTER — PHARMACY VISIT (OUTPATIENT)
Dept: PHARMACY | Facility: CLINIC | Age: 45
End: 2025-05-23
Payer: MEDICARE

## 2025-06-12 ENCOUNTER — APPOINTMENT (OUTPATIENT)
Age: 45
End: 2025-06-12
Payer: COMMERCIAL

## 2025-06-12 VITALS
HEART RATE: 87 BPM | SYSTOLIC BLOOD PRESSURE: 124 MMHG | HEIGHT: 68 IN | BODY MASS INDEX: 23.98 KG/M2 | DIASTOLIC BLOOD PRESSURE: 78 MMHG

## 2025-06-12 DIAGNOSIS — R79.89 LOW TESTOSTERONE IN MALE: ICD-10-CM

## 2025-06-12 PROCEDURE — 3074F SYST BP LT 130 MM HG: CPT | Performed by: NURSE PRACTITIONER

## 2025-06-12 PROCEDURE — 3044F HG A1C LEVEL LT 7.0%: CPT | Performed by: NURSE PRACTITIONER

## 2025-06-12 PROCEDURE — 3078F DIAST BP <80 MM HG: CPT | Performed by: NURSE PRACTITIONER

## 2025-06-12 PROCEDURE — 99204 OFFICE O/P NEW MOD 45 MIN: CPT | Performed by: NURSE PRACTITIONER

## 2025-06-12 NOTE — PROGRESS NOTES
UROLOGIC INITIAL EVALUATION     PROBLEM LIST:  1. Low testosterone in male  Referral to Urology    Testosterone    Testosterone           HISTORY OF PRESENT ILLNESS:   Huber Owens is a 44 y.o. with DM2  Kindly referred by PCP for evaluation and management of low testosterone  Seen unaccompanied  Reports fatigue x 3-4 years  Attributed to DM at first   Persisted despite improving diet, medications  Feels like he needs a nap every day even when on vacation  Work schedule 5 am - 1 pm  Very active, not sedentary  Libido is low  Erectile issues x 1-1.5 years, gets tadalafil 20 mg from [facility]  No spontaneous erections  Did use testosterone in 2015 to help gain muscle, level went up to ?1500  Active smoker    PAST MEDICAL HISTORY:  Medical History[1]    PAST SURGICAL HISTORY:  Surgical History[2]     ALLERGIES:   Allergies[3]     MEDICATIONS:   Medications Ordered Prior to Encounter[4]     SOCIAL HISTORY:  Patient  reports that he has been smoking cigarettes. He has never used smokeless tobacco. He reports current alcohol use. He reports that he does not use drugs.   Social History     Socioeconomic History    Marital status:      Spouse name: Not on file    Number of children: Not on file    Years of education: Not on file    Highest education level: Not on file   Occupational History    Not on file   Tobacco Use    Smoking status: Every Day     Current packs/day: 1.00     Types: Cigarettes    Smokeless tobacco: Never   Substance and Sexual Activity    Alcohol use: Yes    Drug use: Never    Sexual activity: Not on file   Other Topics Concern    Not on file   Social History Narrative    Not on file     Social Drivers of Health     Financial Resource Strain: Not on file   Food Insecurity: Not on file   Transportation Needs: Not on file   Physical Activity: Not on file   Stress: Not on file   Social Connections: Not on file   Intimate Partner Violence: Not on file   Housing Stability: Not on file       FAMILY  "HISTORY:  Family History[5]    REVIEW OF SYSTEMS:  All systems reviewed, pertinent negatives as noted in HPI.     PHYSICAL EXAM:  Visit Vitals  /78   Pulse 87     Constitutional: Well-developed and well-nourished. No distress.    Head: Normocephalic and atraumatic.    Neck: Normal range of motion.     Pulmonary/Chest: Effort normal. No respiratory distress.   Abdominal: Non-distended.  : See below.   Integumentary: No rash or lesions visualized.  Musculoskeletal: Normal range of motion.    Neurological: Alert, grossly intact.  Psychiatric: Normal mood and affect. Thought content normal.      LABORATORY REVIEW:   Lab Results   Component Value Date    BUN 17 08/09/2024    CREATININE 1.03 08/09/2024    EGFR >90 08/09/2024     (L) 08/09/2024    K 4.4 08/09/2024     08/09/2024    CO2 19 (L) 08/09/2024    CALCIUM 9.8 08/09/2024      Lab Results   Component Value Date    WBC 9.8 02/06/2024    RBC 5.73 02/06/2024    HGB 16.6 02/06/2024    HCT 49.0 02/06/2024    MCV 86 02/06/2024    MCH 29.0 02/06/2024    MCHC 33.9 02/06/2024    RDW 12.5 02/06/2024     02/06/2024    MPV 10.9 10/31/2023        No results found for: \"PSA\"          Assessment:      1. Low testosterone in male  Referral to Urology    Testosterone    Testosterone          Huber M Gamalielмария is a 44 y.o. with symptoms of low testosterone including fatigue, low sex drive, lack of spontaneous erections  Moderate ED, MIGUEL ANGEL 9  Testosterone 330 5/7/25  I personally reviewed & interpreted available labs and imaging    Discussed natural history of hypogonadism, levels expected to fall with age  Treatment with testosterone replacement is predicated on presence of symptoms and two consecutive low values  Levels are currently within normal physiologic range  Low testosterone does corollate with erectile dysfunction however testosterone replacement is not necessarily expected to improve sexual function  Testosterone is not always covered by insurance " and patient may have to pay for this out of pocket     Plan:   Recheck testosterone level  Subnormal levels x 2 are required to start treatment  RTC pending results  Encouraged to contact us in the interim with any questions, concerns            [1]   Past Medical History:  Diagnosis Date    Anxiety 10/22/2023    Personal history of other diseases of the digestive system 11/28/2016    History of bilateral inguinal hernias    Pityriasis versicolor 02/02/2016    Tinea versicolor    Type 2 diabetes mellitus without complication, without long-term current use of insulin 12/19/2023   [2]   Past Surgical History:  Procedure Laterality Date    HIP SURGERY  02/02/2016    Hip Surgery    MOUTH SURGERY  02/02/2016    Oral Surgery Tooth Extraction   [3]   Allergies  Allergen Reactions    Rosuvastatin Other     Higher blood sugars - Metformin stopped working    [4]   Current Outpatient Medications on File Prior to Visit   Medication Sig Dispense Refill    Accu-Chek Guide Glucose Meter misc USE AS DIRECTED      Accu-Chek Guide test strips strip CHECK BLOOD SUGAR DAILY BEFORE BREAKFAST AND AS NEEDED. 100 strip 2    atorvastatin (Lipitor) 20 mg tablet Take 1 tablet (20 mg) by mouth once daily. 90 tablet 3    fluticasone (Flonase) 50 mcg/actuation nasal spray Administer 1 spray into each nostril once daily. Shake gently. Before first use, prime pump. After use, clean tip and replace cap.      lancets misc Check blood sugar daily before breakfast and as needed. 100 each 1    tirzepatide (Mounjaro) 10 mg/0.5 mL pen injector Inject 10 mg under the skin 1 (one) time per week. 6 mL 1     No current facility-administered medications on file prior to visit.   [5] No family history on file.

## 2025-06-12 NOTE — LETTER
June 25, 2025     Jyothi Mabry MD  [address]  [facility] [address]    Patient: Huber Owens   YOB: 1980   Date of Visit: 6/12/2025       Dear Dr. Jyothi Mabry MD:    Thank you for referring Huber Owens to me for evaluation. Below are my notes for this consultation.  If you have questions, please do not hesitate to call me. I look forward to following your patient along with you.       Sincerely,     Claudette Thomas, APRN-CNP      CC: No Recipients  ______________________________________________________________________________________    UROLOGIC INITIAL EVALUATION     PROBLEM LIST:  1. Low testosterone in male  Referral to Urology    Testosterone    Testosterone           HISTORY OF PRESENT ILLNESS:   Huber Owens is a 44 y.o. with DM2  Kindly referred by PCP for evaluation and management of low testosterone  Seen unaccompanied  Reports fatigue x 3-4 years  Attributed to DM at first   Persisted despite improving diet, medications  Feels like he needs a nap every day even when on vacation  Work schedule 5 am - 1 pm  Very active, not sedentary  Libido is low  Erectile issues x 1-1.5 years, gets tadalafil 20 mg from [facility]  No spontaneous erections  Did use testosterone in 2015 to help gain muscle, level went up to ?1500  Active smoker    PAST MEDICAL HISTORY:  Medical History[1]    PAST SURGICAL HISTORY:  Surgical History[2]     ALLERGIES:   Allergies[3]     MEDICATIONS:   Medications Ordered Prior to Encounter[4]     SOCIAL HISTORY:  Patient  reports that he has been smoking cigarettes. He has never used smokeless tobacco. He reports current alcohol use. He reports that he does not use drugs.   Social History     Socioeconomic History   • Marital status:      Spouse name: Not on file   • Number of children: Not on file   • Years of education: Not on file   • Highest education level: Not on file   Occupational History   • Not on file   Tobacco Use   • Smoking status: Every Day      "Current packs/day: 1.00     Types: Cigarettes   • Smokeless tobacco: Never   Substance and Sexual Activity   • Alcohol use: Yes   • Drug use: Never   • Sexual activity: Not on file   Other Topics Concern   • Not on file   Social History Narrative   • Not on file     Social Drivers of Health     Financial Resource Strain: Not on file   Food Insecurity: Not on file   Transportation Needs: Not on file   Physical Activity: Not on file   Stress: Not on file   Social Connections: Not on file   Intimate Partner Violence: Not on file   Housing Stability: Not on file       FAMILY HISTORY:  Family History[5]    REVIEW OF SYSTEMS:  All systems reviewed, pertinent negatives as noted in HPI.     PHYSICAL EXAM:  Visit Vitals  /78   Pulse 87     Constitutional: Well-developed and well-nourished. No distress.    Head: Normocephalic and atraumatic.    Neck: Normal range of motion.     Pulmonary/Chest: Effort normal. No respiratory distress.   Abdominal: Non-distended.  : See below.   Integumentary: No rash or lesions visualized.  Musculoskeletal: Normal range of motion.    Neurological: Alert, grossly intact.  Psychiatric: Normal mood and affect. Thought content normal.      LABORATORY REVIEW:   Lab Results   Component Value Date    BUN 17 08/09/2024    CREATININE 1.03 08/09/2024    EGFR >90 08/09/2024     (L) 08/09/2024    K 4.4 08/09/2024     08/09/2024    CO2 19 (L) 08/09/2024    CALCIUM 9.8 08/09/2024      Lab Results   Component Value Date    WBC 9.8 02/06/2024    RBC 5.73 02/06/2024    HGB 16.6 02/06/2024    HCT 49.0 02/06/2024    MCV 86 02/06/2024    MCH 29.0 02/06/2024    MCHC 33.9 02/06/2024    RDW 12.5 02/06/2024     02/06/2024    MPV 10.9 10/31/2023        No results found for: \"PSA\"          Assessment:      1. Low testosterone in male  Referral to Urology    Testosterone    Testosterone          Huber Owens is a 44 y.o. with symptoms of low testosterone including fatigue, low sex drive, " lack of spontaneous erections  Moderate ED, MIGUEL ANGEL 9  Testosterone 330 5/7/25  I personally reviewed & interpreted available labs and imaging    Discussed natural history of hypogonadism, levels expected to fall with age  Treatment with testosterone replacement is predicated on presence of symptoms and two consecutive low values  Levels are currently within normal physiologic range  Low testosterone does corollate with erectile dysfunction however testosterone replacement is not necessarily expected to improve sexual function  Testosterone is not always covered by insurance and patient may have to pay for this out of pocket     Plan:   Recheck testosterone level  Subnormal levels x 2 are required to start treatment  RTC pending results  Encouraged to contact us in the interim with any questions, concerns            [1]  Past Medical History:  Diagnosis Date   • Anxiety 10/22/2023   • Personal history of other diseases of the digestive system 11/28/2016    History of bilateral inguinal hernias   • Pityriasis versicolor 02/02/2016    Tinea versicolor   • Type 2 diabetes mellitus without complication, without long-term current use of insulin 12/19/2023   [2]  Past Surgical History:  Procedure Laterality Date   • HIP SURGERY  02/02/2016    Hip Surgery   • MOUTH SURGERY  02/02/2016    Oral Surgery Tooth Extraction   [3]  Allergies  Allergen Reactions   • Rosuvastatin Other     Higher blood sugars - Metformin stopped working    [4]  Current Outpatient Medications on File Prior to Visit   Medication Sig Dispense Refill   • Accu-Chek Guide Glucose Meter misc USE AS DIRECTED     • Accu-Chek Guide test strips strip CHECK BLOOD SUGAR DAILY BEFORE BREAKFAST AND AS NEEDED. 100 strip 2   • atorvastatin (Lipitor) 20 mg tablet Take 1 tablet (20 mg) by mouth once daily. 90 tablet 3   • fluticasone (Flonase) 50 mcg/actuation nasal spray Administer 1 spray into each nostril once daily. Shake gently. Before first use, prime pump. After  use, clean tip and replace cap.     • lancets misc Check blood sugar daily before breakfast and as needed. 100 each 1   • tirzepatide (Mounjaro) 10 mg/0.5 mL pen injector Inject 10 mg under the skin 1 (one) time per week. 6 mL 1     No current facility-administered medications on file prior to visit.   [5]  No family history on file.

## 2025-06-16 PROCEDURE — RXMED WILLOW AMBULATORY MEDICATION CHARGE

## 2025-06-19 ENCOUNTER — PHARMACY VISIT (OUTPATIENT)
Dept: PHARMACY | Facility: CLINIC | Age: 45
End: 2025-06-19
Payer: MEDICARE

## 2025-06-21 LAB — TESTOST SERPL-MCNC: 427 NG/DL (ref 250–827)

## 2025-07-14 PROCEDURE — RXMED WILLOW AMBULATORY MEDICATION CHARGE

## 2025-07-16 ENCOUNTER — PHARMACY VISIT (OUTPATIENT)
Dept: PHARMACY | Facility: CLINIC | Age: 45
End: 2025-07-16
Payer: MEDICARE

## 2025-08-08 PROCEDURE — RXMED WILLOW AMBULATORY MEDICATION CHARGE

## 2025-08-12 ENCOUNTER — PHARMACY VISIT (OUTPATIENT)
Dept: PHARMACY | Facility: CLINIC | Age: 45
End: 2025-08-12
Payer: MEDICARE

## 2025-08-24 DIAGNOSIS — E78.00 HIGH CHOLESTEROL: ICD-10-CM

## 2025-08-24 DIAGNOSIS — E11.9 TYPE 2 DIABETES MELLITUS WITHOUT COMPLICATION, WITHOUT LONG-TERM CURRENT USE OF INSULIN: Primary | ICD-10-CM

## 2025-08-27 LAB
ALBUMIN SERPL-MCNC: 4.6 G/DL (ref 3.6–5.1)
ALBUMIN/CREAT UR: NORMAL MG/G CREAT
ALP SERPL-CCNC: 51 U/L (ref 36–130)
ALT SERPL-CCNC: 38 U/L (ref 9–46)
ANION GAP SERPL CALCULATED.4IONS-SCNC: 10 MMOL/L (CALC) (ref 7–17)
AST SERPL-CCNC: 25 U/L (ref 10–40)
BILIRUB SERPL-MCNC: 0.7 MG/DL (ref 0.2–1.2)
BUN SERPL-MCNC: 12 MG/DL (ref 7–25)
CALCIUM SERPL-MCNC: 9.6 MG/DL (ref 8.6–10.3)
CHLORIDE SERPL-SCNC: 101 MMOL/L (ref 98–110)
CHOLEST SERPL-MCNC: 123 MG/DL
CHOLEST/HDLC SERPL: 2.2 (CALC)
CO2 SERPL-SCNC: 26 MMOL/L (ref 20–32)
CREAT SERPL-MCNC: 0.87 MG/DL (ref 0.6–1.29)
CREAT UR-MCNC: 51 MG/DL (ref 20–320)
EGFRCR SERPLBLD CKD-EPI 2021: 109 ML/MIN/1.73M2
EST. AVERAGE GLUCOSE BLD GHB EST-MCNC: 103 MG/DL
EST. AVERAGE GLUCOSE BLD GHB EST-SCNC: 5.7 MMOL/L
GLUCOSE SERPL-MCNC: 87 MG/DL (ref 65–99)
HBA1C MFR BLD: 5.2 %
HDLC SERPL-MCNC: 55 MG/DL
LDLC SERPL CALC-MCNC: 51 MG/DL (CALC)
MICROALBUMIN UR-MCNC: <0.2 MG/DL
NONHDLC SERPL-MCNC: 68 MG/DL (CALC)
POTASSIUM SERPL-SCNC: 4.2 MMOL/L (ref 3.5–5.3)
PROT SERPL-MCNC: 7.1 G/DL (ref 6.1–8.1)
SODIUM SERPL-SCNC: 137 MMOL/L (ref 135–146)
TRIGL SERPL-MCNC: 86 MG/DL

## 2025-08-28 ENCOUNTER — APPOINTMENT (OUTPATIENT)
Dept: PRIMARY CARE | Facility: CLINIC | Age: 45
End: 2025-08-28
Payer: COMMERCIAL

## 2025-08-28 VITALS
DIASTOLIC BLOOD PRESSURE: 75 MMHG | HEIGHT: 68 IN | TEMPERATURE: 97 F | HEART RATE: 87 BPM | BODY MASS INDEX: 22.64 KG/M2 | RESPIRATION RATE: 17 BRPM | WEIGHT: 149.4 LBS | OXYGEN SATURATION: 99 % | SYSTOLIC BLOOD PRESSURE: 112 MMHG

## 2025-08-28 DIAGNOSIS — E11.9 TYPE 2 DIABETES MELLITUS WITHOUT COMPLICATION, WITHOUT LONG-TERM CURRENT USE OF INSULIN: Primary | ICD-10-CM

## 2025-08-28 DIAGNOSIS — L30.9 DERMATITIS: ICD-10-CM

## 2025-08-28 DIAGNOSIS — E78.00 HIGH CHOLESTEROL: ICD-10-CM

## 2025-08-28 PROCEDURE — 99214 OFFICE O/P EST MOD 30 MIN: CPT | Performed by: FAMILY MEDICINE

## 2025-08-28 PROCEDURE — 90471 IMMUNIZATION ADMIN: CPT | Performed by: FAMILY MEDICINE

## 2025-08-28 PROCEDURE — 90677 PCV20 VACCINE IM: CPT | Performed by: FAMILY MEDICINE

## 2025-08-28 RX ORDER — ATORVASTATIN CALCIUM 20 MG/1
20 TABLET, FILM COATED ORAL DAILY
Qty: 90 TABLET | Refills: 3 | Status: SHIPPED | OUTPATIENT
Start: 2025-08-28 | End: 2026-08-23

## 2025-08-28 RX ORDER — PREDNISONE 10 MG/1
TABLET ORAL
Qty: 30 TABLET | Refills: 0 | Status: SHIPPED | OUTPATIENT
Start: 2025-08-28 | End: 2025-09-07

## 2025-09-04 PROCEDURE — RXMED WILLOW AMBULATORY MEDICATION CHARGE

## 2025-09-06 ENCOUNTER — PHARMACY VISIT (OUTPATIENT)
Dept: PHARMACY | Facility: CLINIC | Age: 45
End: 2025-09-06
Payer: MEDICARE

## 2025-10-22 ENCOUNTER — APPOINTMENT (OUTPATIENT)
Dept: PRIMARY CARE | Facility: CLINIC | Age: 45
End: 2025-10-22
Payer: COMMERCIAL

## 2025-10-23 ENCOUNTER — APPOINTMENT (OUTPATIENT)
Dept: PHARMACY | Facility: HOSPITAL | Age: 45
End: 2025-10-23
Payer: COMMERCIAL

## 2026-02-11 ENCOUNTER — APPOINTMENT (OUTPATIENT)
Dept: PRIMARY CARE | Facility: CLINIC | Age: 46
End: 2026-02-11
Payer: COMMERCIAL